# Patient Record
Sex: MALE | Race: WHITE | NOT HISPANIC OR LATINO | URBAN - METROPOLITAN AREA
[De-identification: names, ages, dates, MRNs, and addresses within clinical notes are randomized per-mention and may not be internally consistent; named-entity substitution may affect disease eponyms.]

---

## 2021-11-01 ENCOUNTER — INPATIENT (INPATIENT)
Facility: HOSPITAL | Age: 41
LOS: 4 days | Discharge: LEFT BEFORE DISCHARGE | End: 2021-11-06
Attending: STUDENT IN AN ORGANIZED HEALTH CARE EDUCATION/TRAINING PROGRAM | Admitting: STUDENT IN AN ORGANIZED HEALTH CARE EDUCATION/TRAINING PROGRAM
Payer: COMMERCIAL

## 2021-11-01 VITALS
HEIGHT: 74 IN | OXYGEN SATURATION: 100 % | WEIGHT: 212.97 LBS | HEART RATE: 130 BPM | SYSTOLIC BLOOD PRESSURE: 157 MMHG | DIASTOLIC BLOOD PRESSURE: 89 MMHG | RESPIRATION RATE: 18 BRPM

## 2021-11-01 DIAGNOSIS — Z98.890 OTHER SPECIFIED POSTPROCEDURAL STATES: Chronic | ICD-10-CM

## 2021-11-01 DIAGNOSIS — K35.80 UNSPECIFIED ACUTE APPENDICITIS: ICD-10-CM

## 2021-11-01 LAB
ALBUMIN SERPL ELPH-MCNC: 4.7 G/DL — SIGNIFICANT CHANGE UP (ref 3.5–5.2)
ALP SERPL-CCNC: 77 U/L — SIGNIFICANT CHANGE UP (ref 30–115)
ALT FLD-CCNC: 25 U/L — SIGNIFICANT CHANGE UP (ref 0–41)
ANION GAP SERPL CALC-SCNC: 20 MMOL/L — HIGH (ref 7–14)
AST SERPL-CCNC: 19 U/L — SIGNIFICANT CHANGE UP (ref 0–41)
BASOPHILS # BLD AUTO: 0.05 K/UL — SIGNIFICANT CHANGE UP (ref 0–0.2)
BASOPHILS NFR BLD AUTO: 0.9 % — SIGNIFICANT CHANGE UP (ref 0–1)
BILIRUB DIRECT SERPL-MCNC: 0.2 MG/DL — SIGNIFICANT CHANGE UP (ref 0–0.2)
BILIRUB INDIRECT FLD-MCNC: 0.4 MG/DL — SIGNIFICANT CHANGE UP (ref 0.2–1.2)
BILIRUB SERPL-MCNC: 0.6 MG/DL — SIGNIFICANT CHANGE UP (ref 0.2–1.2)
BLD GP AB SCN SERPL QL: SIGNIFICANT CHANGE UP
BUN SERPL-MCNC: 43 MG/DL — HIGH (ref 10–20)
CALCIUM SERPL-MCNC: 10.1 MG/DL — SIGNIFICANT CHANGE UP (ref 8.5–10.1)
CHLORIDE SERPL-SCNC: 90 MMOL/L — LOW (ref 98–110)
CO2 SERPL-SCNC: 26 MMOL/L — SIGNIFICANT CHANGE UP (ref 17–32)
CREAT SERPL-MCNC: 2.2 MG/DL — HIGH (ref 0.7–1.5)
EOSINOPHIL # BLD AUTO: 0 K/UL — SIGNIFICANT CHANGE UP (ref 0–0.7)
EOSINOPHIL NFR BLD AUTO: 0 % — SIGNIFICANT CHANGE UP (ref 0–8)
GLUCOSE SERPL-MCNC: 173 MG/DL — HIGH (ref 70–99)
HCT VFR BLD CALC: 50.7 % — SIGNIFICANT CHANGE UP (ref 42–52)
HGB BLD-MCNC: 17.4 G/DL — SIGNIFICANT CHANGE UP (ref 14–18)
IMM GRANULOCYTES NFR BLD AUTO: 0.5 % — HIGH (ref 0.1–0.3)
LACTATE SERPL-SCNC: 1.1 MMOL/L — SIGNIFICANT CHANGE UP (ref 0.7–2)
LACTATE SERPL-SCNC: 4.6 MMOL/L — CRITICAL HIGH (ref 0.7–2)
LIDOCAIN IGE QN: 19 U/L — SIGNIFICANT CHANGE UP (ref 7–60)
LYMPHOCYTES # BLD AUTO: 0.57 K/UL — LOW (ref 1.2–3.4)
LYMPHOCYTES # BLD AUTO: 9.8 % — LOW (ref 20.5–51.1)
MAGNESIUM SERPL-MCNC: 2.4 MG/DL — SIGNIFICANT CHANGE UP (ref 1.8–2.4)
MANUAL SMEAR VERIFICATION: SIGNIFICANT CHANGE UP
MCHC RBC-ENTMCNC: 31.2 PG — HIGH (ref 27–31)
MCHC RBC-ENTMCNC: 34.3 G/DL — SIGNIFICANT CHANGE UP (ref 32–37)
MCV RBC AUTO: 91 FL — SIGNIFICANT CHANGE UP (ref 80–94)
MONOCYTES # BLD AUTO: 0.69 K/UL — HIGH (ref 0.1–0.6)
MONOCYTES NFR BLD AUTO: 11.9 % — HIGH (ref 1.7–9.3)
NEUTROPHILS # BLD AUTO: 4.45 K/UL — SIGNIFICANT CHANGE UP (ref 1.4–6.5)
NEUTROPHILS NFR BLD AUTO: 76.9 % — HIGH (ref 42.2–75.2)
NEUTS BAND # BLD: 14 % — HIGH (ref 0–6)
NRBC # BLD: 0 /100 WBCS — SIGNIFICANT CHANGE UP (ref 0–0)
NRBC # BLD: 0 /100 — SIGNIFICANT CHANGE UP (ref 0–0)
PLAT MORPH BLD: NORMAL — SIGNIFICANT CHANGE UP
PLATELET # BLD AUTO: 255 K/UL — SIGNIFICANT CHANGE UP (ref 130–400)
POTASSIUM SERPL-MCNC: 3.5 MMOL/L — SIGNIFICANT CHANGE UP (ref 3.5–5)
POTASSIUM SERPL-SCNC: 3.5 MMOL/L — SIGNIFICANT CHANGE UP (ref 3.5–5)
PROT SERPL-MCNC: 8.6 G/DL — HIGH (ref 6–8)
RBC # BLD: 5.57 M/UL — SIGNIFICANT CHANGE UP (ref 4.7–6.1)
RBC # FLD: 12.4 % — SIGNIFICANT CHANGE UP (ref 11.5–14.5)
RBC BLD AUTO: NORMAL — SIGNIFICANT CHANGE UP
SARS-COV-2 RNA SPEC QL NAA+PROBE: SIGNIFICANT CHANGE UP
SODIUM SERPL-SCNC: 136 MMOL/L — SIGNIFICANT CHANGE UP (ref 135–146)
TROPONIN T SERPL-MCNC: <0.01 NG/ML — SIGNIFICANT CHANGE UP
WBC # BLD: 5.79 K/UL — SIGNIFICANT CHANGE UP (ref 4.8–10.8)
WBC # FLD AUTO: 5.79 K/UL — SIGNIFICANT CHANGE UP (ref 4.8–10.8)

## 2021-11-01 PROCEDURE — 93010 ELECTROCARDIOGRAM REPORT: CPT

## 2021-11-01 PROCEDURE — 99285 EMERGENCY DEPT VISIT HI MDM: CPT

## 2021-11-01 PROCEDURE — 99223 1ST HOSP IP/OBS HIGH 75: CPT

## 2021-11-01 PROCEDURE — 74177 CT ABD & PELVIS W/CONTRAST: CPT | Mod: 26,MA

## 2021-11-01 RX ORDER — CHLORHEXIDINE GLUCONATE 213 G/1000ML
1 SOLUTION TOPICAL
Refills: 0 | Status: DISCONTINUED | OUTPATIENT
Start: 2021-11-01 | End: 2021-11-06

## 2021-11-01 RX ORDER — FOLIC ACID 0.8 MG
1 TABLET ORAL ONCE
Refills: 0 | Status: COMPLETED | OUTPATIENT
Start: 2021-11-01 | End: 2021-11-01

## 2021-11-01 RX ORDER — FOLIC ACID 0.8 MG
1 TABLET ORAL DAILY
Refills: 0 | Status: DISCONTINUED | OUTPATIENT
Start: 2021-11-01 | End: 2021-11-03

## 2021-11-01 RX ORDER — SODIUM CHLORIDE 9 MG/ML
1000 INJECTION INTRAMUSCULAR; INTRAVENOUS; SUBCUTANEOUS ONCE
Refills: 0 | Status: COMPLETED | OUTPATIENT
Start: 2021-11-01 | End: 2021-11-01

## 2021-11-01 RX ORDER — MORPHINE SULFATE 50 MG/1
4 CAPSULE, EXTENDED RELEASE ORAL ONCE
Refills: 0 | Status: DISCONTINUED | OUTPATIENT
Start: 2021-11-01 | End: 2021-11-01

## 2021-11-01 RX ORDER — ONDANSETRON 8 MG/1
4 TABLET, FILM COATED ORAL EVERY 8 HOURS
Refills: 0 | Status: DISCONTINUED | OUTPATIENT
Start: 2021-11-01 | End: 2021-11-06

## 2021-11-01 RX ORDER — ONDANSETRON 8 MG/1
4 TABLET, FILM COATED ORAL ONCE
Refills: 0 | Status: COMPLETED | OUTPATIENT
Start: 2021-11-01 | End: 2021-11-01

## 2021-11-01 RX ORDER — ACETAMINOPHEN 500 MG
650 TABLET ORAL EVERY 6 HOURS
Refills: 0 | Status: DISCONTINUED | OUTPATIENT
Start: 2021-11-01 | End: 2021-11-03

## 2021-11-01 RX ORDER — SODIUM CHLORIDE 9 MG/ML
2000 INJECTION, SOLUTION INTRAVENOUS ONCE
Refills: 0 | Status: COMPLETED | OUTPATIENT
Start: 2021-11-01 | End: 2021-11-01

## 2021-11-01 RX ORDER — THIAMINE MONONITRATE (VIT B1) 100 MG
100 TABLET ORAL ONCE
Refills: 0 | Status: COMPLETED | OUTPATIENT
Start: 2021-11-01 | End: 2021-11-01

## 2021-11-01 RX ORDER — SODIUM CHLORIDE 9 MG/ML
1000 INJECTION, SOLUTION INTRAVENOUS
Refills: 0 | Status: DISCONTINUED | OUTPATIENT
Start: 2021-11-01 | End: 2021-11-06

## 2021-11-01 RX ORDER — LISINOPRIL 2.5 MG/1
40 TABLET ORAL DAILY
Refills: 0 | Status: DISCONTINUED | OUTPATIENT
Start: 2021-11-01 | End: 2021-11-03

## 2021-11-01 RX ORDER — PANTOPRAZOLE SODIUM 20 MG/1
40 TABLET, DELAYED RELEASE ORAL
Refills: 0 | Status: DISCONTINUED | OUTPATIENT
Start: 2021-11-01 | End: 2021-11-03

## 2021-11-01 RX ORDER — AMLODIPINE BESYLATE 2.5 MG/1
10 TABLET ORAL DAILY
Refills: 0 | Status: DISCONTINUED | OUTPATIENT
Start: 2021-11-01 | End: 2021-11-03

## 2021-11-01 RX ORDER — THIAMINE MONONITRATE (VIT B1) 100 MG
100 TABLET ORAL DAILY
Refills: 0 | Status: DISCONTINUED | OUTPATIENT
Start: 2021-11-01 | End: 2021-11-03

## 2021-11-01 RX ORDER — MORPHINE SULFATE 50 MG/1
2 CAPSULE, EXTENDED RELEASE ORAL EVERY 6 HOURS
Refills: 0 | Status: DISCONTINUED | OUTPATIENT
Start: 2021-11-01 | End: 2021-11-03

## 2021-11-01 RX ORDER — HEPARIN SODIUM 5000 [USP'U]/ML
5000 INJECTION INTRAVENOUS; SUBCUTANEOUS EVERY 8 HOURS
Refills: 0 | Status: DISCONTINUED | OUTPATIENT
Start: 2021-11-01 | End: 2021-11-06

## 2021-11-01 RX ORDER — PIPERACILLIN AND TAZOBACTAM 4; .5 G/20ML; G/20ML
3.38 INJECTION, POWDER, LYOPHILIZED, FOR SOLUTION INTRAVENOUS ONCE
Refills: 0 | Status: COMPLETED | OUTPATIENT
Start: 2021-11-01 | End: 2021-11-01

## 2021-11-01 RX ORDER — TRAZODONE HCL 50 MG
50 TABLET ORAL AT BEDTIME
Refills: 0 | Status: DISCONTINUED | OUTPATIENT
Start: 2021-11-01 | End: 2021-11-03

## 2021-11-01 RX ORDER — HYDROXYZINE HCL 10 MG
50 TABLET ORAL EVERY 6 HOURS
Refills: 0 | Status: DISCONTINUED | OUTPATIENT
Start: 2021-11-01 | End: 2021-11-03

## 2021-11-01 RX ORDER — PIPERACILLIN AND TAZOBACTAM 4; .5 G/20ML; G/20ML
3.38 INJECTION, POWDER, LYOPHILIZED, FOR SOLUTION INTRAVENOUS EVERY 8 HOURS
Refills: 0 | Status: DISCONTINUED | OUTPATIENT
Start: 2021-11-01 | End: 2021-11-06

## 2021-11-01 RX ADMIN — MORPHINE SULFATE 4 MILLIGRAM(S): 50 CAPSULE, EXTENDED RELEASE ORAL at 11:57

## 2021-11-01 RX ADMIN — Medication 100 MILLIGRAM(S): at 08:58

## 2021-11-01 RX ADMIN — ONDANSETRON 4 MILLIGRAM(S): 8 TABLET, FILM COATED ORAL at 08:58

## 2021-11-01 RX ADMIN — MORPHINE SULFATE 4 MILLIGRAM(S): 50 CAPSULE, EXTENDED RELEASE ORAL at 13:12

## 2021-11-01 RX ADMIN — LISINOPRIL 40 MILLIGRAM(S): 2.5 TABLET ORAL at 16:34

## 2021-11-01 RX ADMIN — Medication 50 MILLIGRAM(S): at 22:26

## 2021-11-01 RX ADMIN — PIPERACILLIN AND TAZOBACTAM 25 GRAM(S): 4; .5 INJECTION, POWDER, LYOPHILIZED, FOR SOLUTION INTRAVENOUS at 22:19

## 2021-11-01 RX ADMIN — SODIUM CHLORIDE 2000 MILLILITER(S): 9 INJECTION, SOLUTION INTRAVENOUS at 11:57

## 2021-11-01 RX ADMIN — MORPHINE SULFATE 4 MILLIGRAM(S): 50 CAPSULE, EXTENDED RELEASE ORAL at 22:33

## 2021-11-01 RX ADMIN — MORPHINE SULFATE 4 MILLIGRAM(S): 50 CAPSULE, EXTENDED RELEASE ORAL at 08:58

## 2021-11-01 RX ADMIN — SODIUM CHLORIDE 150 MILLILITER(S): 9 INJECTION, SOLUTION INTRAVENOUS at 22:32

## 2021-11-01 RX ADMIN — Medication 1 MILLIGRAM(S): at 09:09

## 2021-11-01 RX ADMIN — PIPERACILLIN AND TAZOBACTAM 200 GRAM(S): 4; .5 INJECTION, POWDER, LYOPHILIZED, FOR SOLUTION INTRAVENOUS at 11:53

## 2021-11-01 RX ADMIN — HEPARIN SODIUM 5000 UNIT(S): 5000 INJECTION INTRAVENOUS; SUBCUTANEOUS at 22:26

## 2021-11-01 RX ADMIN — SODIUM CHLORIDE 150 MILLILITER(S): 9 INJECTION, SOLUTION INTRAVENOUS at 16:42

## 2021-11-01 RX ADMIN — Medication 25 MILLIGRAM(S): at 17:54

## 2021-11-01 RX ADMIN — PIPERACILLIN AND TAZOBACTAM 3.38 GRAM(S): 4; .5 INJECTION, POWDER, LYOPHILIZED, FOR SOLUTION INTRAVENOUS at 13:52

## 2021-11-01 RX ADMIN — HEPARIN SODIUM 5000 UNIT(S): 5000 INJECTION INTRAVENOUS; SUBCUTANEOUS at 18:27

## 2021-11-01 RX ADMIN — AMLODIPINE BESYLATE 10 MILLIGRAM(S): 2.5 TABLET ORAL at 16:33

## 2021-11-01 RX ADMIN — SODIUM CHLORIDE 1000 MILLILITER(S): 9 INJECTION INTRAMUSCULAR; INTRAVENOUS; SUBCUTANEOUS at 16:41

## 2021-11-01 RX ADMIN — MORPHINE SULFATE 4 MILLIGRAM(S): 50 CAPSULE, EXTENDED RELEASE ORAL at 22:26

## 2021-11-01 RX ADMIN — MORPHINE SULFATE 2 MILLIGRAM(S): 50 CAPSULE, EXTENDED RELEASE ORAL at 17:45

## 2021-11-01 RX ADMIN — SODIUM CHLORIDE 1000 MILLILITER(S): 9 INJECTION INTRAMUSCULAR; INTRAVENOUS; SUBCUTANEOUS at 13:02

## 2021-11-01 RX ADMIN — MORPHINE SULFATE 4 MILLIGRAM(S): 50 CAPSULE, EXTENDED RELEASE ORAL at 13:52

## 2021-11-01 RX ADMIN — PANTOPRAZOLE SODIUM 40 MILLIGRAM(S): 20 TABLET, DELAYED RELEASE ORAL at 16:34

## 2021-11-01 RX ADMIN — SODIUM CHLORIDE 2000 MILLILITER(S): 9 INJECTION, SOLUTION INTRAVENOUS at 08:58

## 2021-11-01 RX ADMIN — ONDANSETRON 4 MILLIGRAM(S): 8 TABLET, FILM COATED ORAL at 22:26

## 2021-11-01 NOTE — ED PROVIDER NOTE - CARE PLAN
1 Principal Discharge DX:	Acute appendicitis  Secondary Diagnosis:	SBO (small bowel obstruction)  Secondary Diagnosis:	LEANN (acute kidney injury)  Secondary Diagnosis:	Alcohol dependence

## 2021-11-01 NOTE — H&P ADULT - ASSESSMENT
41yo man with a history of HTN now w/ n/v for 3d, found to have acute perforated appendicits on CT. HD stable.

## 2021-11-01 NOTE — H&P ADULT - HISTORY OF PRESENT ILLNESS
40 yr old male with PMH of HTN presents with abdominal pain, nausea and vomiting for last 3 days. Pt states this started 3 days ago. He was in so much that he could not keep any food or water down. He thought it could be food poisoning and tried to ride it out. He states it got slightly better but still in pain. He states he has been having bowels and passing gas without any issue. He admits to drinking daily or every other day but denies any other illicit drugs. His last drink was 3 days ago. He denies getting a colonoscopy. He admits to diarrhea, chills night sweats but denies fever.  40 yr old male with PMH of HTN presents with abdominal pain, nausea and vomiting for last 3 days. Pt states this started 3 days ago. He was in so much that he could not keep any food or water down. He thought it could be food poisoning and thought his symptoms would resolve.  He states it got slightly better but still in pain. He states he has been having bowels and passing gas without any issue. He admits to drinking daily or every other day but denies any other illicit drugs. His last drink was 3 days ago. He denies getting a colonoscopy. He admits to diarrhea, chills night sweats but denies fever.

## 2021-11-01 NOTE — H&P ADULT - ATTENDING COMMENTS
This is a 41yo man with a history of HTN and alcohol dependence, who presents with nausea and NBNB emesis for 3 days. Pt describes having eaten beef tacos on Friday evening, and the following morning awoke with nausea. Nausea is described as persistent and led to several episodes of vomiting throughout the day on Saturday. Pt describes relief with emesis, but developed muscular soreness and epigastric pain secondary to repeated episodes. The pain remained in this location and did not migrate at any point; pt describes it as feeling similar to food poisoning he has experienced in the past. There is no radiation or alleviating/exacerbating factors. Pt denies fevers, chills. He endorses diarrhea without blood being present. He has cont'd ot pass stool and flatus til the present. He denies recent wt loss, sick contacts, travel. Denies recent abx use. He has never undergone upper or lower endoscopy, and denies a known personal or family history of IBD. He felt very dehydrated but 'could not keep anything down' so came to the ED for evaluation.    In ER, pt found to be afebrile. The abdomen is distended but normal in size per his baseline per pt and girlfriend at bedside. There is moderate tenderness in the epigastrium without rebound. There is no RLQ tenderness. There is a reducible umbilical hernia without overlying skin change. He is non-tremulous, and the skin is not jaundiced.    Labs show a normal WBC ct but with band shift; LEANN with Cr 2.2. Lactate on admission elevated 4.6; down to 1.1 with fluid - appears to be hemoconcentrated. CT was done and showed acute perforated appendicitis with secondary ileus vs SBO. I have spoken with two separate radiologists who agree, that despite the patient's atypical presentation, this denotes a primary appendiceal with secondary ileus picture, rather than a small bowel pathologic process with secondary appendiceal involvement. There is no e/o bowel ischemia or abscess at the moment.    I advised the pt we will admit him; keep NPO. if nausea or emesis recur, will place NG tube. CIWA protocol. Broad spec abx. Trend labs. Hydrate with crystalloid. Serial abdominal exams. I let the pt know 50% of patients will develop intraabdominal abscess in this setting, necessitating perc drainage at the time of index hospitalization; a much smaller minority will not respond to nonoperative management and will require urgent operative intervention. Should neither of these apply to the pt, and he recovers uneventfully during this hospitalization, will likely plan for outpatient lower endoscopy given unusual presentation followed by elective interval appendectomy in 4-6 weeks.

## 2021-11-01 NOTE — H&P ADULT - NSHPSOCIALHISTORY_GEN_ALL_CORE
Patient is a current smoker; 5 ciagrettes per day  No illicit drug use  3 beers daily Patient is a current smoker; 5 cigarettes per day  No illicit drug use  3 beers daily

## 2021-11-01 NOTE — H&P ADULT - NSHPPHYSICALEXAM_GEN_ALL_CORE
Gen: Well-appearing man, in NAD supine  HEENT: Sclerae anicteric; trachea midline. NCAT.  CV: rrr; no m/g/r  Lungs: CTAB; no cyanosis and no accessory muscle use  Abd: Firm; mildly distended. Mild tenderness in epigastrium without rebound. +reducible umbilical hernia.  Ext: Warm and NV intact with intact pedal pulses  Neuro: Strength 5/5 and equivalent in all extremities; DTRs intact.  Skin: Warm; dry  Psych: Normal mood and affect Vital Signs Last 24 Hrs  T(C): 36.3 (01 Nov 2021 15:46), Max: 37.1 (01 Nov 2021 13:47)  T(F): 97.3 (01 Nov 2021 15:46), Max: 98.8 (01 Nov 2021 13:47)  HR: 110 (01 Nov 2021 15:46) (102 - 130)  BP: 123/84 (01 Nov 2021 15:46) (123/84 - 157/89)  RR: 18 (01 Nov 2021 15:46) (16 - 18)  SpO2: 98% (01 Nov 2021 15:46) (97% - 100%)    Gen: Well-appearing man, in NAD supine  HEENT: Sclerae anicteric; trachea midline. NCAT.  CV: rrr; no m/g/r  Lungs: CTAB; no cyanosis and no accessory muscle use  Abd: Firm; mildly distended. Mild tenderness in epigastrium without rebound. +reducible umbilical hernia.  Ext: Warm and NV intact with intact pedal pulses  Neuro: Strength 5/5 and equivalent in all extremities; DTRs intact.  Skin: Warm; dry  Psych: Normal mood and affect

## 2021-11-01 NOTE — ED PROVIDER NOTE - OBJECTIVE STATEMENT
41 yo male, pmh of htn, daily etoh use ( 6 beers per day), presents to ed for epigastric pain, x 2 days, mild, aching, no radiation, a/w nv. Denies fever, chills, cp, sob, diarrhea, dysuria, tremors.

## 2021-11-01 NOTE — ED PROVIDER NOTE - PHYSICAL EXAMINATION
Physical Exam    Vital Signs: I have reviewed the initial vital signs.  Constitutional: well-nourished, appears stated age, no acute distress  Eyes: Conjunctiva pink, Sclera clear  Cardiovascular: S1 and S2, tachycardia, regular rhythm, well-perfused extremities, radial pulses equal and 2+, pedal pulses 2+ and equal   Respiratory: unlabored respiratory effort, clear to auscultation bilaterally no wheezing, rales and rhonchi  Gastrointestinal: soft, epigastric ttp, no guarding or rebound, no cvattp, no pulsatile mass, normal bowl sounds  Musculoskeletal: supple neck, no lower extremity edema, no midline tenderness  Integumentary: warm, dry, no rash  Neurologic: awake, alert, nvi, no tremors.

## 2021-11-01 NOTE — ED PROVIDER NOTE - ATTENDING CONTRIBUTION TO CARE
40 yr old male  eval for eval of n/v with epigastric, periumbiluical pain. No fever, chills. pt having bowel mvts on exam pt no distress mild tachy, epigastric ttp w/o r/g. labs showing  nohelia, normal wbc, lipase. ct showing Findings compatible with acute appendicitis. Distal small bowel bowel obstruction is also present. Ill-defined central mesenteric locule of fluid and gas measuring 3.2 x 2.9 cm, compatible with perforation, presumably related to the appendicitis but given small bowel obstruction also present, cannot exclude this being from bowel perforation. pt given abx, seen by surgery admitted for further eval.

## 2021-11-01 NOTE — H&P ADULT - NSHPLABSRESULTS_GEN_ALL_CORE
WBC ct 5.79  +bandemia    Lact 4.6 -> 1.1 with 2L crystalloid  Cr 2.2  LFTs normal    CT with perforated appendicitis and secondary SBO vs ileus WBC ct 5.79  +bandemia    Lact 4.6 -> 1.1 with 2L crystalloid  Cr 2.2  LFTs normal    CT with perforated appendicitis and secondary SBO vs ileus      11-01    136  |  90<L>  |  43<H>  ----------------------------<  173<H>  3.5   |  26  |  2.2<H>    Ca    10.1      01 Nov 2021 09:00  Mg     2.4     11-01    TPro  8.6<H>  /  Alb  4.7  /  TBili  0.6  /  DBili  0.2  /  AST  19  /  ALT  25  /  AlkPhos  77  11-01                            17.4   5.79  )-----------( 255      ( 01 Nov 2021 09:00 )             50.7     CAPILLARY BLOOD GLUCOSE        CARDIAC MARKERS ( 01 Nov 2021 09:00 )  x     / <0.01 ng/mL / x     / x     / x          < from: CT Abdomen and Pelvis w/ IV Cont (11.01.21 @ 10:39) >    IMPRESSION:    Findings compatible with acute appendicitis. Distal small bowel bowel obstruction is also present. Ill-defined central mesenteric locule of fluid and gas measuring 3.2 x 2.9 cm, compatible with perforation, presumably related to the appendicitis but given small bowel obstruction also present, cannot exclude this being from bowel perforation.    Findings discussed with Dr. Robb on 11/1/2021 at 11:14 AM.      < end of copied text >

## 2021-11-01 NOTE — H&P ADULT - NSICDXFAMILYHX_GEN_ALL_CORE_FT
FAMILY HISTORY:  Father  Still living? Unknown  Family history of hyperlipidemia, Age at diagnosis: Age Unknown    Mother  Still living? Unknown  Maternal family history of hypertension, Age at diagnosis: Age Unknown

## 2021-11-01 NOTE — ED PROVIDER NOTE - CLINICAL SUMMARY MEDICAL DECISION MAKING FREE TEXT BOX
abdominal pain, with n/v. CT showing acute appendicitis. Distal small bowel bowel obstruction is also present. Ill-defined central mesenteric locule of fluid and gas measuring 3.2 x 2.9 cm, compatible with perforation, presumably related to the appendicitis but given small bowel obstruction also present, cannot exclude this being from bowel perforation. pt given abx, seen by surgery admitted for further eval.

## 2021-11-01 NOTE — SBIRT NOTE ADULT - NSSBIRTBRIEFINTDET_GEN_A_CORE
Screening results were reviewed with the patient and patient was provided information about healthy guidelines and potential negative consequences associated with level of risk. Motivation and readiness to reduce or stop use was discussed and goals and activities to make changes were suggested/offered.

## 2021-11-01 NOTE — ED ADULT NURSE REASSESSMENT NOTE - NS ED NURSE REASSESS COMMENT FT1
Pt's discharge heart rate found to be 120. RAYMOND Gifford advised. Pt given Morphine 2 mg IV (PRN) as well as Librium 25 mg PO. 4th floor receiving RN Luda advised.

## 2021-11-02 LAB
ANION GAP SERPL CALC-SCNC: 11 MMOL/L — SIGNIFICANT CHANGE UP (ref 7–14)
BASOPHILS # BLD AUTO: 0.01 K/UL — SIGNIFICANT CHANGE UP (ref 0–0.2)
BASOPHILS NFR BLD AUTO: 0.2 % — SIGNIFICANT CHANGE UP (ref 0–1)
BUN SERPL-MCNC: 28 MG/DL — HIGH (ref 10–20)
CALCIUM SERPL-MCNC: 8.7 MG/DL — SIGNIFICANT CHANGE UP (ref 8.5–10.1)
CHLORIDE SERPL-SCNC: 97 MMOL/L — LOW (ref 98–110)
CO2 SERPL-SCNC: 29 MMOL/L — SIGNIFICANT CHANGE UP (ref 17–32)
CREAT SERPL-MCNC: 0.9 MG/DL — SIGNIFICANT CHANGE UP (ref 0.7–1.5)
EOSINOPHIL # BLD AUTO: 0.02 K/UL — SIGNIFICANT CHANGE UP (ref 0–0.7)
EOSINOPHIL NFR BLD AUTO: 0.4 % — SIGNIFICANT CHANGE UP (ref 0–8)
GLUCOSE SERPL-MCNC: 108 MG/DL — HIGH (ref 70–99)
HCT VFR BLD CALC: 39.9 % — LOW (ref 42–52)
HGB BLD-MCNC: 13.9 G/DL — LOW (ref 14–18)
IMM GRANULOCYTES NFR BLD AUTO: 0.2 % — SIGNIFICANT CHANGE UP (ref 0.1–0.3)
LYMPHOCYTES # BLD AUTO: 0.77 K/UL — LOW (ref 1.2–3.4)
LYMPHOCYTES # BLD AUTO: 14.9 % — LOW (ref 20.5–51.1)
MAGNESIUM SERPL-MCNC: 2 MG/DL — SIGNIFICANT CHANGE UP (ref 1.8–2.4)
MCHC RBC-ENTMCNC: 31.4 PG — HIGH (ref 27–31)
MCHC RBC-ENTMCNC: 34.8 G/DL — SIGNIFICANT CHANGE UP (ref 32–37)
MCV RBC AUTO: 90.3 FL — SIGNIFICANT CHANGE UP (ref 80–94)
MONOCYTES # BLD AUTO: 0.82 K/UL — HIGH (ref 0.1–0.6)
MONOCYTES NFR BLD AUTO: 15.9 % — HIGH (ref 1.7–9.3)
NEUTROPHILS # BLD AUTO: 3.54 K/UL — SIGNIFICANT CHANGE UP (ref 1.4–6.5)
NEUTROPHILS NFR BLD AUTO: 68.4 % — SIGNIFICANT CHANGE UP (ref 42.2–75.2)
NRBC # BLD: 0 /100 WBCS — SIGNIFICANT CHANGE UP (ref 0–0)
PLATELET # BLD AUTO: 211 K/UL — SIGNIFICANT CHANGE UP (ref 130–400)
POTASSIUM SERPL-MCNC: 3.2 MMOL/L — LOW (ref 3.5–5)
POTASSIUM SERPL-SCNC: 3.2 MMOL/L — LOW (ref 3.5–5)
RBC # BLD: 4.42 M/UL — LOW (ref 4.7–6.1)
RBC # FLD: 12.5 % — SIGNIFICANT CHANGE UP (ref 11.5–14.5)
SODIUM SERPL-SCNC: 137 MMOL/L — SIGNIFICANT CHANGE UP (ref 135–146)
WBC # BLD: 5.17 K/UL — SIGNIFICANT CHANGE UP (ref 4.8–10.8)
WBC # FLD AUTO: 5.17 K/UL — SIGNIFICANT CHANGE UP (ref 4.8–10.8)

## 2021-11-02 PROCEDURE — 99232 SBSQ HOSP IP/OBS MODERATE 35: CPT

## 2021-11-02 RX ORDER — MORPHINE SULFATE 50 MG/1
2 CAPSULE, EXTENDED RELEASE ORAL ONCE
Refills: 0 | Status: DISCONTINUED | OUTPATIENT
Start: 2021-11-02 | End: 2021-11-02

## 2021-11-02 RX ORDER — POTASSIUM CHLORIDE 20 MEQ
20 PACKET (EA) ORAL
Refills: 0 | Status: COMPLETED | OUTPATIENT
Start: 2021-11-02 | End: 2021-11-02

## 2021-11-02 RX ADMIN — HEPARIN SODIUM 5000 UNIT(S): 5000 INJECTION INTRAVENOUS; SUBCUTANEOUS at 05:34

## 2021-11-02 RX ADMIN — MORPHINE SULFATE 2 MILLIGRAM(S): 50 CAPSULE, EXTENDED RELEASE ORAL at 14:16

## 2021-11-02 RX ADMIN — MORPHINE SULFATE 2 MILLIGRAM(S): 50 CAPSULE, EXTENDED RELEASE ORAL at 05:37

## 2021-11-02 RX ADMIN — Medication 50 MILLIEQUIVALENT(S): at 16:31

## 2021-11-02 RX ADMIN — Medication 1 MILLIGRAM(S): at 12:12

## 2021-11-02 RX ADMIN — Medication 50 MILLIGRAM(S): at 21:04

## 2021-11-02 RX ADMIN — Medication 50 MILLIEQUIVALENT(S): at 14:00

## 2021-11-02 RX ADMIN — PIPERACILLIN AND TAZOBACTAM 25 GRAM(S): 4; .5 INJECTION, POWDER, LYOPHILIZED, FOR SOLUTION INTRAVENOUS at 14:01

## 2021-11-02 RX ADMIN — MORPHINE SULFATE 2 MILLIGRAM(S): 50 CAPSULE, EXTENDED RELEASE ORAL at 10:30

## 2021-11-02 RX ADMIN — AMLODIPINE BESYLATE 10 MILLIGRAM(S): 2.5 TABLET ORAL at 05:33

## 2021-11-02 RX ADMIN — SODIUM CHLORIDE 150 MILLILITER(S): 9 INJECTION, SOLUTION INTRAVENOUS at 14:00

## 2021-11-02 RX ADMIN — MORPHINE SULFATE 2 MILLIGRAM(S): 50 CAPSULE, EXTENDED RELEASE ORAL at 21:03

## 2021-11-02 RX ADMIN — HEPARIN SODIUM 5000 UNIT(S): 5000 INJECTION INTRAVENOUS; SUBCUTANEOUS at 21:05

## 2021-11-02 RX ADMIN — PIPERACILLIN AND TAZOBACTAM 25 GRAM(S): 4; .5 INJECTION, POWDER, LYOPHILIZED, FOR SOLUTION INTRAVENOUS at 05:35

## 2021-11-02 RX ADMIN — CHLORHEXIDINE GLUCONATE 1 APPLICATION(S): 213 SOLUTION TOPICAL at 05:35

## 2021-11-02 RX ADMIN — PANTOPRAZOLE SODIUM 40 MILLIGRAM(S): 20 TABLET, DELAYED RELEASE ORAL at 05:34

## 2021-11-02 RX ADMIN — PIPERACILLIN AND TAZOBACTAM 25 GRAM(S): 4; .5 INJECTION, POWDER, LYOPHILIZED, FOR SOLUTION INTRAVENOUS at 21:03

## 2021-11-02 RX ADMIN — HEPARIN SODIUM 5000 UNIT(S): 5000 INJECTION INTRAVENOUS; SUBCUTANEOUS at 14:03

## 2021-11-02 RX ADMIN — SODIUM CHLORIDE 150 MILLILITER(S): 9 INJECTION, SOLUTION INTRAVENOUS at 23:34

## 2021-11-02 RX ADMIN — Medication 100 MILLIGRAM(S): at 12:12

## 2021-11-02 RX ADMIN — LISINOPRIL 40 MILLIGRAM(S): 2.5 TABLET ORAL at 05:34

## 2021-11-02 RX ADMIN — MORPHINE SULFATE 2 MILLIGRAM(S): 50 CAPSULE, EXTENDED RELEASE ORAL at 10:29

## 2021-11-02 RX ADMIN — MORPHINE SULFATE 2 MILLIGRAM(S): 50 CAPSULE, EXTENDED RELEASE ORAL at 05:46

## 2021-11-02 NOTE — PROGRESS NOTE ADULT - SUBJECTIVE AND OBJECTIVE BOX
S; Pt reports mild improvement in abdominal pain/distention today - having flatus and watery BM this AM. Still with concentrated urine but voiding without difficulty. +nausea but no vomiting.  Tolerating chips & sips.  Denies shakes/tremors. Afebrile overnight  O; Vital Signs Last 24 Hrs  T(C): 36.9 (02 Nov 2021 04:43), Max: 37.1 (01 Nov 2021 13:47)  T(F): 98.4 (02 Nov 2021 04:43), Max: 98.8 (01 Nov 2021 13:47)  HR: 102 (02 Nov 2021 04:43) (102 - 120)  BP: 136/84 (02 Nov 2021 04:43) (123/84 - 148/101)  BP(mean): --  RR: 18 (02 Nov 2021 04:43) (16 - 20)  SpO2: 98% (01 Nov 2021 17:42) (97% - 98%)    MEDICATIONS  (STANDING):  amLODIPine   Tablet 10 milliGRAM(s) Oral daily  chlorhexidine 4% Liquid 1 Application(s) Topical <User Schedule>  folic acid 1 milliGRAM(s) Oral daily  heparin   Injectable 5000 Unit(s) SubCutaneous every 8 hours  lactated ringers. 1000 milliLiter(s) (150 mL/Hr) IV Continuous <Continuous>  lisinopril 40 milliGRAM(s) Oral daily  pantoprazole    Tablet 40 milliGRAM(s) Oral before breakfast  piperacillin/tazobactam IVPB.. 3.375 Gram(s) IV Intermittent every 8 hours  potassium chloride  20 mEq/100 mL IVPB 20 milliEquivalent(s) IV Intermittent every 2 hours  thiamine 100 milliGRAM(s) Oral daily    MEDICATIONS  (PRN):  acetaminophen     Tablet .. 650 milliGRAM(s) Oral every 6 hours PRN Temp greater or equal to 38C (100.4F)  chlordiazePOXIDE 25 milliGRAM(s) Oral every 4 hours PRN Alcohol Withdrawal Symptoms  hydrOXYzine hydrochloride 50 milliGRAM(s) Oral every 6 hours PRN Anxiety etoh WD  morphine  - Injectable 2 milliGRAM(s) IV Push every 6 hours PRN Severe Pain (7 - 10)  ondansetron Injectable 4 milliGRAM(s) IV Push every 8 hours PRN Nausea and/or Vomiting  traZODone 50 milliGRAM(s) Oral at bedtime PRN sleep    EXAM:  lungs: cta  cvs: s1s2, +tachy  abd: soft, distended, +LUQ/upper abdominal and RLQ tenderness, decreased BS    Labs:  CAPILLARY BLOOD GLUCOSE                              13.9   5.17  )-----------( 211      ( 02 Nov 2021 06:25 )             39.9       Auto Neutrophil %: 68.4 % (11-02-21 @ 06:25)  Auto Immature Granulocyte %: 0.2 % (11-02-21 @ 06:25)    11-02    137  |  97<L>  |  28<H>  ----------------------------<  108<H>  3.2<L>   |  29  |  0.9      Calcium, Total Serum: 8.7 mg/dL (11-02-21 @ 06:25)      LFTs:             8.6  | 0.6  | 19       ------------------[77      ( 01 Nov 2021 09:00 )  4.7  | 0.2  | 25          Lipase:19     Amylase:x         Lactate, Blood: 1.1 mmol/L (11-01-21 @ 11:13)  Lactate, Blood: 4.6 mmol/L (11-01-21 @ 09:00)      Coags:    CARDIAC MARKERS ( 01 Nov 2021 09:00 )  x     / <0.01 ng/mL / x     / x     / x                 S; Pt reports mild improvement in abdominal pain/distention today - having flatus and watery BM this AM. Still with concentrated urine but voiding without difficulty. +nausea but no vomiting.  Tolerating chips & sips.  Denies shakes/tremors. Afebrile overnight  O; Vital Signs Last 24 Hrs  T(C): 36.9 (02 Nov 2021 04:43), Max: 37.1 (01 Nov 2021 13:47)  T(F): 98.4 (02 Nov 2021 04:43), Max: 98.8 (01 Nov 2021 13:47)  HR: 102 (02 Nov 2021 04:43) (102 - 120)  BP: 136/84 (02 Nov 2021 04:43) (123/84 - 148/101)  BP(mean): --  RR: 18 (02 Nov 2021 04:43) (16 - 20)  SpO2: 98% (01 Nov 2021 17:42) (97% - 98%)    MEDICATIONS  (STANDING):  amLODIPine   Tablet 10 milliGRAM(s) Oral daily  chlorhexidine 4% Liquid 1 Application(s) Topical <User Schedule>  folic acid 1 milliGRAM(s) Oral daily  heparin   Injectable 5000 Unit(s) SubCutaneous every 8 hours  lactated ringers. 1000 milliLiter(s) (150 mL/Hr) IV Continuous <Continuous>  lisinopril 40 milliGRAM(s) Oral daily  pantoprazole    Tablet 40 milliGRAM(s) Oral before breakfast  piperacillin/tazobactam IVPB.. 3.375 Gram(s) IV Intermittent every 8 hours  potassium chloride  20 mEq/100 mL IVPB 20 milliEquivalent(s) IV Intermittent every 2 hours  thiamine 100 milliGRAM(s) Oral daily    MEDICATIONS  (PRN):  acetaminophen     Tablet .. 650 milliGRAM(s) Oral every 6 hours PRN Temp greater or equal to 38C (100.4F)  chlordiazePOXIDE 25 milliGRAM(s) Oral every 4 hours PRN Alcohol Withdrawal Symptoms  hydrOXYzine hydrochloride 50 milliGRAM(s) Oral every 6 hours PRN Anxiety etoh WD  morphine  - Injectable 2 milliGRAM(s) IV Push every 6 hours PRN Severe Pain (7 - 10)  ondansetron Injectable 4 milliGRAM(s) IV Push every 8 hours PRN Nausea and/or Vomiting  traZODone 50 milliGRAM(s) Oral at bedtime PRN sleep    EXAM:  Gen: well appearing man, supine in NAD  lungs: cta  cvs: s1s2, +tachy  abd: soft, distended, +LUQ/upper abdominal and RLQ tenderness, decreased BS  ext: warm and NV intact with intac pulses  skin: warm and dry; no jaundice  neuro: CN 2-12 intact; strength equal bilaterally  psych: normal mood and affect    Labs:  CAPILLARY BLOOD GLUCOSE                              13.9   5.17  )-----------( 211      ( 02 Nov 2021 06:25 )             39.9       Auto Neutrophil %: 68.4 % (11-02-21 @ 06:25)  Auto Immature Granulocyte %: 0.2 % (11-02-21 @ 06:25)    11-02    137  |  97<L>  |  28<H>  ----------------------------<  108<H>  3.2<L>   |  29  |  0.9      Calcium, Total Serum: 8.7 mg/dL (11-02-21 @ 06:25)      LFTs:             8.6  | 0.6  | 19       ------------------[77      ( 01 Nov 2021 09:00 )  4.7  | 0.2  | 25          Lipase:19     Amylase:x         Lactate, Blood: 1.1 mmol/L (11-01-21 @ 11:13)  Lactate, Blood: 4.6 mmol/L (11-01-21 @ 09:00)      Coags:    CARDIAC MARKERS ( 01 Nov 2021 09:00 )  x     / <0.01 ng/mL / x     / x     / x

## 2021-11-03 LAB
ANION GAP SERPL CALC-SCNC: 14 MMOL/L — SIGNIFICANT CHANGE UP (ref 7–14)
BUN SERPL-MCNC: 22 MG/DL — HIGH (ref 10–20)
CALCIUM SERPL-MCNC: 8.8 MG/DL — SIGNIFICANT CHANGE UP (ref 8.5–10.1)
CHLORIDE SERPL-SCNC: 98 MMOL/L — SIGNIFICANT CHANGE UP (ref 98–110)
CO2 SERPL-SCNC: 24 MMOL/L — SIGNIFICANT CHANGE UP (ref 17–32)
COVID-19 SPIKE DOMAIN AB INTERP: POSITIVE
COVID-19 SPIKE DOMAIN ANTIBODY RESULT: >250 U/ML — HIGH
CREAT SERPL-MCNC: 0.9 MG/DL — SIGNIFICANT CHANGE UP (ref 0.7–1.5)
GLUCOSE SERPL-MCNC: 102 MG/DL — HIGH (ref 70–99)
HCT VFR BLD CALC: 44 % — SIGNIFICANT CHANGE UP (ref 42–52)
HGB BLD-MCNC: 14.9 G/DL — SIGNIFICANT CHANGE UP (ref 14–18)
MAGNESIUM SERPL-MCNC: 1.9 MG/DL — SIGNIFICANT CHANGE UP (ref 1.8–2.4)
MCHC RBC-ENTMCNC: 31 PG — SIGNIFICANT CHANGE UP (ref 27–31)
MCHC RBC-ENTMCNC: 33.9 G/DL — SIGNIFICANT CHANGE UP (ref 32–37)
MCV RBC AUTO: 91.5 FL — SIGNIFICANT CHANGE UP (ref 80–94)
NRBC # BLD: 0 /100 WBCS — SIGNIFICANT CHANGE UP (ref 0–0)
PHOSPHATE SERPL-MCNC: 3.2 MG/DL — SIGNIFICANT CHANGE UP (ref 2.1–4.9)
PLATELET # BLD AUTO: 241 K/UL — SIGNIFICANT CHANGE UP (ref 130–400)
POTASSIUM SERPL-MCNC: 3.2 MMOL/L — LOW (ref 3.5–5)
POTASSIUM SERPL-SCNC: 3.2 MMOL/L — LOW (ref 3.5–5)
RBC # BLD: 4.81 M/UL — SIGNIFICANT CHANGE UP (ref 4.7–6.1)
RBC # FLD: 12.6 % — SIGNIFICANT CHANGE UP (ref 11.5–14.5)
SARS-COV-2 IGG+IGM SERPL QL IA: >250 U/ML — HIGH
SARS-COV-2 IGG+IGM SERPL QL IA: POSITIVE
SODIUM SERPL-SCNC: 136 MMOL/L — SIGNIFICANT CHANGE UP (ref 135–146)
WBC # BLD: 7.53 K/UL — SIGNIFICANT CHANGE UP (ref 4.8–10.8)
WBC # FLD AUTO: 7.53 K/UL — SIGNIFICANT CHANGE UP (ref 4.8–10.8)

## 2021-11-03 PROCEDURE — 71045 X-RAY EXAM CHEST 1 VIEW: CPT | Mod: 26

## 2021-11-03 PROCEDURE — 74018 RADEX ABDOMEN 1 VIEW: CPT | Mod: 26

## 2021-11-03 PROCEDURE — 99232 SBSQ HOSP IP/OBS MODERATE 35: CPT

## 2021-11-03 RX ORDER — SODIUM CHLORIDE 9 MG/ML
1000 INJECTION INTRAMUSCULAR; INTRAVENOUS; SUBCUTANEOUS ONCE
Refills: 0 | Status: COMPLETED | OUTPATIENT
Start: 2021-11-03 | End: 2021-11-03

## 2021-11-03 RX ORDER — POTASSIUM CHLORIDE 20 MEQ
20 PACKET (EA) ORAL
Refills: 0 | Status: COMPLETED | OUTPATIENT
Start: 2021-11-03 | End: 2021-11-03

## 2021-11-03 RX ORDER — MAGNESIUM SULFATE 500 MG/ML
1 VIAL (ML) INJECTION ONCE
Refills: 0 | Status: COMPLETED | OUTPATIENT
Start: 2021-11-03 | End: 2021-11-03

## 2021-11-03 RX ORDER — MORPHINE SULFATE 50 MG/1
2 CAPSULE, EXTENDED RELEASE ORAL ONCE
Refills: 0 | Status: DISCONTINUED | OUTPATIENT
Start: 2021-11-03 | End: 2021-11-03

## 2021-11-03 RX ORDER — KETOROLAC TROMETHAMINE 30 MG/ML
15 SYRINGE (ML) INJECTION EVERY 6 HOURS
Refills: 0 | Status: DISCONTINUED | OUTPATIENT
Start: 2021-11-03 | End: 2021-11-06

## 2021-11-03 RX ORDER — POTASSIUM CHLORIDE 20 MEQ
20 PACKET (EA) ORAL
Refills: 0 | Status: DISCONTINUED | OUTPATIENT
Start: 2021-11-03 | End: 2021-11-03

## 2021-11-03 RX ORDER — SIMETHICONE 80 MG/1
80 TABLET, CHEWABLE ORAL EVERY 6 HOURS
Refills: 0 | Status: DISCONTINUED | OUTPATIENT
Start: 2021-11-03 | End: 2021-11-03

## 2021-11-03 RX ORDER — BENZOCAINE 10 %
1 GEL (GRAM) MUCOUS MEMBRANE EVERY 4 HOURS
Refills: 0 | Status: DISCONTINUED | OUTPATIENT
Start: 2021-11-03 | End: 2021-11-04

## 2021-11-03 RX ORDER — PANTOPRAZOLE SODIUM 20 MG/1
40 TABLET, DELAYED RELEASE ORAL DAILY
Refills: 0 | Status: DISCONTINUED | OUTPATIENT
Start: 2021-11-03 | End: 2021-11-06

## 2021-11-03 RX ADMIN — Medication 100 MILLIGRAM(S): at 11:37

## 2021-11-03 RX ADMIN — HEPARIN SODIUM 5000 UNIT(S): 5000 INJECTION INTRAVENOUS; SUBCUTANEOUS at 13:22

## 2021-11-03 RX ADMIN — Medication 50 MILLIEQUIVALENT(S): at 14:55

## 2021-11-03 RX ADMIN — Medication 1 MILLIGRAM(S): at 11:37

## 2021-11-03 RX ADMIN — SODIUM CHLORIDE 1000 MILLILITER(S): 9 INJECTION INTRAMUSCULAR; INTRAVENOUS; SUBCUTANEOUS at 21:48

## 2021-11-03 RX ADMIN — Medication 100 GRAM(S): at 14:55

## 2021-11-03 RX ADMIN — Medication 50 MILLIEQUIVALENT(S): at 16:34

## 2021-11-03 RX ADMIN — PIPERACILLIN AND TAZOBACTAM 25 GRAM(S): 4; .5 INJECTION, POWDER, LYOPHILIZED, FOR SOLUTION INTRAVENOUS at 21:19

## 2021-11-03 RX ADMIN — PIPERACILLIN AND TAZOBACTAM 25 GRAM(S): 4; .5 INJECTION, POWDER, LYOPHILIZED, FOR SOLUTION INTRAVENOUS at 05:02

## 2021-11-03 RX ADMIN — MORPHINE SULFATE 2 MILLIGRAM(S): 50 CAPSULE, EXTENDED RELEASE ORAL at 05:30

## 2021-11-03 RX ADMIN — AMLODIPINE BESYLATE 10 MILLIGRAM(S): 2.5 TABLET ORAL at 05:02

## 2021-11-03 RX ADMIN — MORPHINE SULFATE 2 MILLIGRAM(S): 50 CAPSULE, EXTENDED RELEASE ORAL at 00:03

## 2021-11-03 RX ADMIN — Medication 1 SPRAY(S): at 21:19

## 2021-11-03 RX ADMIN — MORPHINE SULFATE 2 MILLIGRAM(S): 50 CAPSULE, EXTENDED RELEASE ORAL at 06:26

## 2021-11-03 RX ADMIN — PIPERACILLIN AND TAZOBACTAM 25 GRAM(S): 4; .5 INJECTION, POWDER, LYOPHILIZED, FOR SOLUTION INTRAVENOUS at 13:21

## 2021-11-03 RX ADMIN — SODIUM CHLORIDE 150 MILLILITER(S): 9 INJECTION, SOLUTION INTRAVENOUS at 03:43

## 2021-11-03 RX ADMIN — HEPARIN SODIUM 5000 UNIT(S): 5000 INJECTION INTRAVENOUS; SUBCUTANEOUS at 05:03

## 2021-11-03 RX ADMIN — LISINOPRIL 40 MILLIGRAM(S): 2.5 TABLET ORAL at 05:02

## 2021-11-03 RX ADMIN — SODIUM CHLORIDE 150 MILLILITER(S): 9 INJECTION, SOLUTION INTRAVENOUS at 11:37

## 2021-11-03 RX ADMIN — MORPHINE SULFATE 2 MILLIGRAM(S): 50 CAPSULE, EXTENDED RELEASE ORAL at 05:02

## 2021-11-03 RX ADMIN — PANTOPRAZOLE SODIUM 40 MILLIGRAM(S): 20 TABLET, DELAYED RELEASE ORAL at 05:03

## 2021-11-03 RX ADMIN — HEPARIN SODIUM 5000 UNIT(S): 5000 INJECTION INTRAVENOUS; SUBCUTANEOUS at 21:19

## 2021-11-03 RX ADMIN — ONDANSETRON 4 MILLIGRAM(S): 8 TABLET, FILM COATED ORAL at 13:34

## 2021-11-03 RX ADMIN — MORPHINE SULFATE 2 MILLIGRAM(S): 50 CAPSULE, EXTENDED RELEASE ORAL at 06:45

## 2021-11-03 NOTE — PROGRESS NOTE ADULT - SUBJECTIVE AND OBJECTIVE BOX
S:  Pt reports feeling more bloated in upper abdomen with burping, since MN.  He is having flatus and passing watery BM's overnight and this AM though. No N/V  O; Vital Signs Last 24 Hrs  T(C): 36.4 (03 Nov 2021 04:33), Max: 37.3 (02 Nov 2021 21:32)  T(F): 97.6 (03 Nov 2021 04:33), Max: 99.2 (02 Nov 2021 21:32)  HR: 95 (03 Nov 2021 04:33) (95 - 104)  BP: 143/98 (03 Nov 2021 04:33) (129/84 - 143/98)  BP(mean): --  RR: 18 (03 Nov 2021 04:33) (18 - 18)  SpO2: --    MEDICATIONS  (STANDING):  amLODIPine   Tablet 10 milliGRAM(s) Oral daily  chlorhexidine 4% Liquid 1 Application(s) Topical <User Schedule>  folic acid 1 milliGRAM(s) Oral daily  heparin   Injectable 5000 Unit(s) SubCutaneous every 8 hours  lactated ringers. 1000 milliLiter(s) (150 mL/Hr) IV Continuous <Continuous>  lisinopril 40 milliGRAM(s) Oral daily  pantoprazole    Tablet 40 milliGRAM(s) Oral before breakfast  piperacillin/tazobactam IVPB.. 3.375 Gram(s) IV Intermittent every 8 hours  thiamine 100 milliGRAM(s) Oral daily    MEDICATIONS  (PRN):  acetaminophen     Tablet .. 650 milliGRAM(s) Oral every 6 hours PRN Temp greater or equal to 38C (100.4F)  chlordiazePOXIDE 25 milliGRAM(s) Oral every 4 hours PRN Alcohol Withdrawal Symptoms  hydrOXYzine hydrochloride 50 milliGRAM(s) Oral every 6 hours PRN Anxiety etoh WD  morphine  - Injectable 2 milliGRAM(s) IV Push every 6 hours PRN Severe Pain (7 - 10)  ondansetron Injectable 4 milliGRAM(s) IV Push every 8 hours PRN Nausea and/or Vomiting  simethicone 80 milliGRAM(s) Chew every 6 hours PRN Gas  traZODone 50 milliGRAM(s) Oral at bedtime PRN sleep    IV Fluids:  lactated ringers.: Solution, 1000 milliLiter(s) infuse at 150 mL/Hr    EXAM:  lungs: cta  cvs: s1s2, slightly tachycardic  abd: distended, firm w/ S:  Pt reports feeling more bloated in upper abdomen with burping, since MN.  He is having flatus and passing watery BM's overnight and this AM though. No N/V  O; Vital Signs Last 24 Hrs  T(C): 36.4 (03 Nov 2021 04:33), Max: 37.3 (02 Nov 2021 21:32)  T(F): 97.6 (03 Nov 2021 04:33), Max: 99.2 (02 Nov 2021 21:32)  HR: 95 (03 Nov 2021 04:33) (95 - 104)  BP: 143/98 (03 Nov 2021 04:33) (129/84 - 143/98)  BP(mean): --  RR: 18 (03 Nov 2021 04:33) (18 - 18)  SpO2: --    MEDICATIONS  (STANDING):  amLODIPine   Tablet 10 milliGRAM(s) Oral daily  chlorhexidine 4% Liquid 1 Application(s) Topical <User Schedule>  folic acid 1 milliGRAM(s) Oral daily  heparin   Injectable 5000 Unit(s) SubCutaneous every 8 hours  lactated ringers. 1000 milliLiter(s) (150 mL/Hr) IV Continuous <Continuous>  lisinopril 40 milliGRAM(s) Oral daily  pantoprazole    Tablet 40 milliGRAM(s) Oral before breakfast  piperacillin/tazobactam IVPB.. 3.375 Gram(s) IV Intermittent every 8 hours  thiamine 100 milliGRAM(s) Oral daily    MEDICATIONS  (PRN):  acetaminophen     Tablet .. 650 milliGRAM(s) Oral every 6 hours PRN Temp greater or equal to 38C (100.4F)  chlordiazePOXIDE 25 milliGRAM(s) Oral every 4 hours PRN Alcohol Withdrawal Symptoms  hydrOXYzine hydrochloride 50 milliGRAM(s) Oral every 6 hours PRN Anxiety etoh WD  morphine  - Injectable 2 milliGRAM(s) IV Push every 6 hours PRN Severe Pain (7 - 10)  ondansetron Injectable 4 milliGRAM(s) IV Push every 8 hours PRN Nausea and/or Vomiting  simethicone 80 milliGRAM(s) Chew every 6 hours PRN Gas  traZODone 50 milliGRAM(s) Oral at bedtime PRN sleep    IV Fluids:  lactated ringers.: Solution, 1000 milliLiter(s) infuse at 150 mL/Hr    EXAM:  lungs: cta  cvs: s1s2, slightly tachycardic  abd: distended, firm w/mild generalized tenderness +tympanic. no BS appreciated    Labs:  CAPILLARY BLOOD GLUCOSE                              14.9   7.53  )-----------( 241      ( 03 Nov 2021 07:48 )             44.0         11-03    136  |  98  |  22<H>  ----------------------------<  102<H>  3.2<L>   |  24  |  0.9      Calcium, Total Serum: 8.8 mg/dL (11-03-21 @ 07:48)      LFTs:     Lactate, Blood: 1.1 mmol/L (11-01-21 @ 11:13)  Lactate, Blood: 4.6 mmol/L (11-01-21 @ 09:00)      Coags:    RADIOLOGY:  KUB: pending results Oriented - self; Oriented - place; Oriented - time

## 2021-11-03 NOTE — CHART NOTE - NSCHARTNOTEFT_GEN_A_CORE
On afternoon rounds, patient admits to still being nauseas and vomiting again. He does report that he is still passing gas. Decision made to place NGT. Abdomen still distended. 900 cc bilious output returned. Made strict NPO. Will keep NGT to low continuous suction. CXR pending.

## 2021-11-04 LAB
ANION GAP SERPL CALC-SCNC: 11 MMOL/L — SIGNIFICANT CHANGE UP (ref 7–14)
BUN SERPL-MCNC: 24 MG/DL — HIGH (ref 10–20)
CALCIUM SERPL-MCNC: 8.4 MG/DL — LOW (ref 8.5–10.1)
CHLORIDE SERPL-SCNC: 102 MMOL/L — SIGNIFICANT CHANGE UP (ref 98–110)
CO2 SERPL-SCNC: 27 MMOL/L — SIGNIFICANT CHANGE UP (ref 17–32)
CREAT SERPL-MCNC: 1.1 MG/DL — SIGNIFICANT CHANGE UP (ref 0.7–1.5)
GLUCOSE SERPL-MCNC: 110 MG/DL — HIGH (ref 70–99)
HCT VFR BLD CALC: 37.9 % — LOW (ref 42–52)
HGB BLD-MCNC: 12.9 G/DL — LOW (ref 14–18)
MAGNESIUM SERPL-MCNC: 2 MG/DL — SIGNIFICANT CHANGE UP (ref 1.8–2.4)
MCHC RBC-ENTMCNC: 31.5 PG — HIGH (ref 27–31)
MCHC RBC-ENTMCNC: 34 G/DL — SIGNIFICANT CHANGE UP (ref 32–37)
MCV RBC AUTO: 92.4 FL — SIGNIFICANT CHANGE UP (ref 80–94)
NRBC # BLD: 0 /100 WBCS — SIGNIFICANT CHANGE UP (ref 0–0)
PHOSPHATE SERPL-MCNC: 2.5 MG/DL — SIGNIFICANT CHANGE UP (ref 2.1–4.9)
PLATELET # BLD AUTO: 272 K/UL — SIGNIFICANT CHANGE UP (ref 130–400)
POTASSIUM SERPL-MCNC: 3 MMOL/L — LOW (ref 3.5–5)
POTASSIUM SERPL-SCNC: 3 MMOL/L — LOW (ref 3.5–5)
RBC # BLD: 4.1 M/UL — LOW (ref 4.7–6.1)
RBC # FLD: 13 % — SIGNIFICANT CHANGE UP (ref 11.5–14.5)
SODIUM SERPL-SCNC: 140 MMOL/L — SIGNIFICANT CHANGE UP (ref 135–146)
WBC # BLD: 15.07 K/UL — HIGH (ref 4.8–10.8)
WBC # FLD AUTO: 15.07 K/UL — HIGH (ref 4.8–10.8)

## 2021-11-04 PROCEDURE — 74021 RADEX ABDOMEN 3+ VIEWS: CPT | Mod: 26

## 2021-11-04 PROCEDURE — 99232 SBSQ HOSP IP/OBS MODERATE 35: CPT

## 2021-11-04 RX ORDER — POTASSIUM CHLORIDE 20 MEQ
20 PACKET (EA) ORAL
Refills: 0 | Status: DISCONTINUED | OUTPATIENT
Start: 2021-11-04 | End: 2021-11-04

## 2021-11-04 RX ORDER — POTASSIUM CHLORIDE 20 MEQ
20 PACKET (EA) ORAL ONCE
Refills: 0 | Status: DISCONTINUED | OUTPATIENT
Start: 2021-11-04 | End: 2021-11-04

## 2021-11-04 RX ORDER — POTASSIUM CHLORIDE 20 MEQ
20 PACKET (EA) ORAL
Refills: 0 | Status: COMPLETED | OUTPATIENT
Start: 2021-11-04 | End: 2021-11-05

## 2021-11-04 RX ORDER — LISINOPRIL 2.5 MG/1
40 TABLET ORAL DAILY
Refills: 0 | Status: DISCONTINUED | OUTPATIENT
Start: 2021-11-04 | End: 2021-11-06

## 2021-11-04 RX ORDER — AMLODIPINE BESYLATE 2.5 MG/1
10 TABLET ORAL DAILY
Refills: 0 | Status: DISCONTINUED | OUTPATIENT
Start: 2021-11-04 | End: 2021-11-06

## 2021-11-04 RX ADMIN — PIPERACILLIN AND TAZOBACTAM 25 GRAM(S): 4; .5 INJECTION, POWDER, LYOPHILIZED, FOR SOLUTION INTRAVENOUS at 05:16

## 2021-11-04 RX ADMIN — Medication 100 MILLIEQUIVALENT(S): at 21:56

## 2021-11-04 RX ADMIN — LISINOPRIL 40 MILLIGRAM(S): 2.5 TABLET ORAL at 13:23

## 2021-11-04 RX ADMIN — HEPARIN SODIUM 5000 UNIT(S): 5000 INJECTION INTRAVENOUS; SUBCUTANEOUS at 05:16

## 2021-11-04 RX ADMIN — AMLODIPINE BESYLATE 10 MILLIGRAM(S): 2.5 TABLET ORAL at 13:23

## 2021-11-04 RX ADMIN — PANTOPRAZOLE SODIUM 40 MILLIGRAM(S): 20 TABLET, DELAYED RELEASE ORAL at 11:08

## 2021-11-04 RX ADMIN — PIPERACILLIN AND TAZOBACTAM 25 GRAM(S): 4; .5 INJECTION, POWDER, LYOPHILIZED, FOR SOLUTION INTRAVENOUS at 21:52

## 2021-11-04 RX ADMIN — Medication 15 MILLIGRAM(S): at 19:36

## 2021-11-04 RX ADMIN — Medication 15 MILLIGRAM(S): at 20:49

## 2021-11-04 RX ADMIN — PIPERACILLIN AND TAZOBACTAM 25 GRAM(S): 4; .5 INJECTION, POWDER, LYOPHILIZED, FOR SOLUTION INTRAVENOUS at 13:23

## 2021-11-04 NOTE — PROGRESS NOTE ADULT - SUBJECTIVE AND OBJECTIVE BOX
Progress Note: General Surgery  Patient: PRASANNA MARTINO , 40y (1980)Male   MRN: [id number]  Location: [place] [address]  Visit: 11-01-21 Inpatient  Date: 11-04-21 @ 13:43    Admit Diagnosis/Chief Complaint: Perforated appendicitis, nonsurgical management with IV antibiotics.  patient developed ileus, was having emesis yesterday and required NGT.  About 1500 cc of bilious output since insertion. (initial 900, 600 overnight)  This morning patient reports feeling much better. He is soft, he is still mildly distended. Nontender. Having gas and bowel movements.   Encouraged patient to allow NGT to remain to suction for some more time, but he was adamant about removal. We explained the risks to removal, and how we may need to replace the tube if he vomits again. Patient understands, NGT removed.       Vitals: T(F): 96.3 (11-04-21 @ 12:58), Max: 97.9 (11-04-21 @ 04:54)  HR: 100 (11-04-21 @ 12:58)  BP: 160/96 (11-04-21 @ 12:58) (135/91 - 160/96)  RR: 16 (11-04-21 @ 12:58)  SpO2: --    In:   11-03-21 @ 07:01  -  11-04-21 @ 07:00  --------------------------------------------------------  IN: 0 mL      Out:   11-03-21 @ 07:01  -  11-04-21 @ 07:00  --------------------------------------------------------  OUT:    Nasogastric/Oral tube (mL): 1360 mL  Total OUT: 1360 mL        Net:   11-03-21 @ 07:01  -  11-04-21 @ 07:00  --------------------------------------------------------  NET: -1360 mL        Diet: Diet, NPO (11-03-21 @ 17:39)    IV Fluids: lactated ringers. 1000 milliLiter(s) (150 mL/Hr) IV Continuous <Continuous>      Physical Examination:  General Appearance: NAD  HEENT: EOMI, sclera non-icteric.  Heart: RRR   Lungs: CTABL.   Abdomen:  Soft, nontender, mildly distended.   MSK/Extremities: Warm & well-perfused.   Skin: Warm, dry. No jaundice.       Medications: [Standing]  amLODIPine   Tablet 10 milliGRAM(s) Oral daily  chlorhexidine 4% Liquid 1 Application(s) Topical <User Schedule>  heparin   Injectable 5000 Unit(s) SubCutaneous every 8 hours  lactated ringers. 1000 milliLiter(s) (150 mL/Hr) IV Continuous <Continuous>  lisinopril 40 milliGRAM(s) Oral daily  pantoprazole  Injectable 40 milliGRAM(s) IV Push daily  piperacillin/tazobactam IVPB.. 3.375 Gram(s) IV Intermittent every 8 hours    DVT Prophylaxis: heparin   Injectable 5000 Unit(s) SubCutaneous every 8 hours    GI Prophylaxis: pantoprazole  Injectable 40 milliGRAM(s) IV Push daily    Antibiotics: piperacillin/tazobactam IVPB.. 3.375 Gram(s) IV Intermittent every 8 hours    Anticoagulation:   Medications:[PRN]  benzocaine 20% Spray 1 Spray(s) Topical every 4 hours PRN  ketorolac   Injectable 15 milliGRAM(s) IV Push every 6 hours PRN  ondansetron Injectable 4 milliGRAM(s) IV Push every 8 hours PRN      Labs:                        14.9   7.53  )-----------( 241      ( 03 Nov 2021 07:48 )             44.0     11-03    136  |  98  |  22<H>  ----------------------------<  102<H>  3.2<L>   |  24  |  0.9    Ca    8.8      03 Nov 2021 07:48  Phos  3.2     11-03  Mg     1.9     11-03          Imaging:   Xray Chest 1 View- PORTABLE-Urgent:   EXAM:  XR CHEST PORTABLE URGENT 1V          PROCEDURE DATE:  11/03/2021      INTERPRETATION:  Clinical History / Reason for exam: Feeding tube placement    Comparison : Chest radiograph None.    Technique/Positioning: Frontal, portable.    Findings:    Support devices: Feeding tube tip is seen overlying the left upper quadrant.    Cardiac/mediastinum/hilum: Unremarkable.    Lung parenchyma/Pleura: Within normal limits.    Skeleton/soft tissues: Unremarkable for age. There are dilated loops of small bowel in the partially imaged abdomen.    Impression:    No radiographic evidence of acute cardiopulmonary disease.    Dilated loops of small bowel in the partially imaged abdomen    --- End of Report ---          PORTER BROOKS MD; Attending Radiologist  This document has been electronically signed. Nov 4 2021 11:52AM (11-03-21 @ 17:52)

## 2021-11-04 NOTE — CHART NOTE - NSCHARTNOTEFT_GEN_A_CORE
Reviewed obstructive series. Small bowel still dilated with air fluid levels. Clinically, patient reports that he feels well and abdominal exam unchanged from this morning.  I did recommend NGT reinsertion, as the bowel is still grossly dilated, but patient refused. Patient agreed to strict NPO. Will continue to reassess

## 2021-11-05 LAB
ANION GAP SERPL CALC-SCNC: 13 MMOL/L — SIGNIFICANT CHANGE UP (ref 7–14)
BUN SERPL-MCNC: 19 MG/DL — SIGNIFICANT CHANGE UP (ref 10–20)
CALCIUM SERPL-MCNC: 9 MG/DL — SIGNIFICANT CHANGE UP (ref 8.5–10.1)
CHLORIDE SERPL-SCNC: 100 MMOL/L — SIGNIFICANT CHANGE UP (ref 98–110)
CO2 SERPL-SCNC: 26 MMOL/L — SIGNIFICANT CHANGE UP (ref 17–32)
CREAT SERPL-MCNC: 1 MG/DL — SIGNIFICANT CHANGE UP (ref 0.7–1.5)
GLUCOSE SERPL-MCNC: 81 MG/DL — SIGNIFICANT CHANGE UP (ref 70–99)
HCT VFR BLD CALC: 41.1 % — LOW (ref 42–52)
HGB BLD-MCNC: 14 G/DL — SIGNIFICANT CHANGE UP (ref 14–18)
MAGNESIUM SERPL-MCNC: 2 MG/DL — SIGNIFICANT CHANGE UP (ref 1.8–2.4)
MCHC RBC-ENTMCNC: 31.5 PG — HIGH (ref 27–31)
MCHC RBC-ENTMCNC: 34.1 G/DL — SIGNIFICANT CHANGE UP (ref 32–37)
MCV RBC AUTO: 92.4 FL — SIGNIFICANT CHANGE UP (ref 80–94)
NRBC # BLD: 0 /100 WBCS — SIGNIFICANT CHANGE UP (ref 0–0)
PHOSPHATE SERPL-MCNC: 2.8 MG/DL — SIGNIFICANT CHANGE UP (ref 2.1–4.9)
PLATELET # BLD AUTO: 323 K/UL — SIGNIFICANT CHANGE UP (ref 130–400)
POTASSIUM SERPL-MCNC: 3.7 MMOL/L — SIGNIFICANT CHANGE UP (ref 3.5–5)
POTASSIUM SERPL-SCNC: 3.7 MMOL/L — SIGNIFICANT CHANGE UP (ref 3.5–5)
RBC # BLD: 4.45 M/UL — LOW (ref 4.7–6.1)
RBC # FLD: 13.2 % — SIGNIFICANT CHANGE UP (ref 11.5–14.5)
SODIUM SERPL-SCNC: 139 MMOL/L — SIGNIFICANT CHANGE UP (ref 135–146)
WBC # BLD: 17.23 K/UL — HIGH (ref 4.8–10.8)
WBC # FLD AUTO: 17.23 K/UL — HIGH (ref 4.8–10.8)

## 2021-11-05 PROCEDURE — 99232 SBSQ HOSP IP/OBS MODERATE 35: CPT

## 2021-11-05 PROCEDURE — 74019 RADEX ABDOMEN 2 VIEWS: CPT | Mod: 26

## 2021-11-05 PROCEDURE — 74177 CT ABD & PELVIS W/CONTRAST: CPT | Mod: 26

## 2021-11-05 RX ORDER — IOHEXOL 300 MG/ML
30 INJECTION, SOLUTION INTRAVENOUS ONCE
Refills: 0 | Status: COMPLETED | OUTPATIENT
Start: 2021-11-05 | End: 2021-11-05

## 2021-11-05 RX ADMIN — PIPERACILLIN AND TAZOBACTAM 25 GRAM(S): 4; .5 INJECTION, POWDER, LYOPHILIZED, FOR SOLUTION INTRAVENOUS at 13:36

## 2021-11-05 RX ADMIN — Medication 100 MILLIEQUIVALENT(S): at 00:24

## 2021-11-05 RX ADMIN — PIPERACILLIN AND TAZOBACTAM 25 GRAM(S): 4; .5 INJECTION, POWDER, LYOPHILIZED, FOR SOLUTION INTRAVENOUS at 21:53

## 2021-11-05 RX ADMIN — AMLODIPINE BESYLATE 10 MILLIGRAM(S): 2.5 TABLET ORAL at 05:17

## 2021-11-05 RX ADMIN — Medication 100 MILLIEQUIVALENT(S): at 01:55

## 2021-11-05 RX ADMIN — PIPERACILLIN AND TAZOBACTAM 25 GRAM(S): 4; .5 INJECTION, POWDER, LYOPHILIZED, FOR SOLUTION INTRAVENOUS at 05:18

## 2021-11-05 RX ADMIN — IOHEXOL 30 MILLILITER(S): 300 INJECTION, SOLUTION INTRAVENOUS at 11:54

## 2021-11-05 RX ADMIN — PANTOPRAZOLE SODIUM 40 MILLIGRAM(S): 20 TABLET, DELAYED RELEASE ORAL at 11:29

## 2021-11-05 RX ADMIN — LISINOPRIL 40 MILLIGRAM(S): 2.5 TABLET ORAL at 05:18

## 2021-11-05 NOTE — PROGRESS NOTE ADULT - ATTENDING COMMENTS
Afebrile. Feels marginally improved from yest; still with some epigastric pain. No nausea or vomiting with sips liquid. Passing stool and flatus. denies tremulousness. Exam stable from prior, with mild epigastric tenderness and no rebound or guarding. WBC ct, Cr both improved. Plan to cont bowel rest (ice chips, sips liquids permitted), broad spec abx, serial exams, DVT ppx.
Afebrile. Little to no abdominal pain this morning; distention has greatly subsided with NG tube insertion. Initial output was bilious and high (900cc), but this has tapered off and this morning only scant amount bile tinged output present. Bowel fxn continues. Pt denies f/c and has been ambulaitng. He very much wants NG removed. Labs and f/u AXR pending. Will remove NG, encourage ambulation, cont IV abx and bolus 1L crystalloid this afternoon in lieu of low tachycardia 100s (suspect dehydration from GI losses). Commence home antihypertensives; if bowel dilation improving on AXR and pt cont to have bowel fxn, consider trial of sips CLD this evening.
Afebrile. No nausea or emesis, despite KUB findings. Denies abdominal pain. Passing stool and flatus. Exam with distended, nontender abdomen. WBC ct up to 15. Plan for repeat CT ap to eval for progression of ileus vs pSBO and to eval for abscess in lieu of worsening leukocytosis. Cont abx and keep NPO for now. We have emphasized to the pt multiple times that NG tube is necessary at this point to enhance odds of successful recovery with nonop management, however, he declines.
Afebrile; worsening distention and pt experienced emesis this morning which was NBNB. He felt better afterward; pain is improving since yest. Bowel fxn continues. Exam with distention and minimal diffuse tenderness without peritonitis. WBC ct normal. XR with no read yet, but apparent ileus. Plan to keep NPO except ice chips; cont broad spec abx; DVT ppx and ambulation. Suspect mild ileus which will resolve in coming days; pt was advised if further emesis is experienced, NG tube placement will be necessary and he is amenable to this.

## 2021-11-05 NOTE — PROGRESS NOTE ADULT - SUBJECTIVE AND OBJECTIVE BOX
Progress Note: General Surgery  Patient: PRASANNA MARTINO , 40y (1980)Male   MRN: [id number]  Location: [address] [address]       Admit Diagnosis/Chief Complaint: Perforated appendicitis, nonsurgical management with IV antibiotics.  patient developed ileus,   this am reports NO n/v / belching wants to eat   denies chills fever  had 3 BM loose         Vital Signs Last 24 Hrs  T(C): 36.3 (05 Nov 2021 04:50), Max: 37 (04 Nov 2021 21:35)  T(F): 97.3 (05 Nov 2021 04:50), Max: 98.6 (04 Nov 2021 21:35)  HR: 99 (05 Nov 2021 04:50) (95 - 100)  BP: 157/102 (05 Nov 2021 04:50) (140/92 - 160/96)  RR: 16 (05 Nov 2021 04:50) (16 - 16)      I&O's Detail          Diet: Diet, NPO (11-03-21 @ 17:39)    IV Fluids: lactated ringers. 1000 milliLiter(s) (150 mL/Hr) IV Continuous <Continuous>      Physical Examination:  General Appearance: NAD  HEENT: EOMI, sclera non-icteric.  Heart: RRR   Lungs: CTABL.   Abdomen:  Soft, nontender, mildly distended.   MSK/Extremities: Warm & well-perfused.   Skin: Warm, dry. No jaundice.       Medications: [Standing]  amLODIPine   Tablet 10 milliGRAM(s) Oral daily  chlorhexidine 4% Liquid 1 Application(s) Topical <User Schedule>  heparin   Injectable 5000 Unit(s) SubCutaneous every 8 hours  lactated ringers. 1000 milliLiter(s) (150 mL/Hr) IV Continuous <Continuous>  lisinopril 40 milliGRAM(s) Oral daily  pantoprazole  Injectable 40 milliGRAM(s) IV Push daily  piperacillin/tazobactam IVPB.. 3.375 Gram(s) IV Intermittent every 8 hours    DVT Prophylaxis: heparin   Injectable 5000 Unit(s) SubCutaneous every 8 hours    GI Prophylaxis: pantoprazole  Injectable 40 milliGRAM(s) IV Push daily    Antibiotics: piperacillin/tazobactam IVPB.. 3.375 Gram(s) IV Intermittent every 8 hours    Anticoagulation:   Medications:[PRN]  benzocaine 20% Spray 1 Spray(s) Topical every 4 hours PRN  ketorolac   Injectable 15 milliGRAM(s) IV Push every 6 hours PRN  ondansetron Injectable 4 milliGRAM(s) IV Push every 8 hours PRN      Labs:               11-05    139  |  100  |  19  ----------------------------<  81  3.7   |  26  |  1.0    Ca    9.0      05 Nov 2021 07:13  Phos  2.8     11-05  Mg     2.0     11-05                              14.0   17.23 )-----------( 323      ( 05 Nov 2021 07:13 )             41.1     CAPILLARY BLOOD GLUCOSE              Imaging:   Xray Chest 1 View- PORTABLE-Urgent:   EXAM:  XR CHEST PORTABLE URGENT 1V          PROCEDURE DATE:  11/03/2021      INTERPRETATION:  Clinical History / Reason for exam: Feeding tube placement    Comparison : Chest radiograph None.    Technique/Positioning: Frontal, portable.    Findings:    Support devices: Feeding tube tip is seen overlying the left upper quadrant.    Cardiac/mediastinum/hilum: Unremarkable.    Lung parenchyma/Pleura: Within normal limits.    Skeleton/soft tissues: Unremarkable for age. There are dilated loops of small bowel in the partially imaged abdomen.    Impression:    No radiographic evidence of acute cardiopulmonary disease.    Dilated loops of small bowel in the partially imaged abdomen    --- End of Report ---          PORTER BROOKS MD; Attending Radiologist  This document has been electronically signed. Nov 4 2021 11:52AM (11-03-21 @ 17:52)

## 2021-11-06 ENCOUNTER — TRANSCRIPTION ENCOUNTER (OUTPATIENT)
Age: 41
End: 2021-11-06

## 2021-11-06 VITALS
HEART RATE: 99 BPM | DIASTOLIC BLOOD PRESSURE: 97 MMHG | RESPIRATION RATE: 16 BRPM | TEMPERATURE: 98 F | SYSTOLIC BLOOD PRESSURE: 168 MMHG

## 2021-11-06 LAB
ANION GAP SERPL CALC-SCNC: 14 MMOL/L — SIGNIFICANT CHANGE UP (ref 7–14)
BUN SERPL-MCNC: 13 MG/DL — SIGNIFICANT CHANGE UP (ref 10–20)
CALCIUM SERPL-MCNC: 8.7 MG/DL — SIGNIFICANT CHANGE UP (ref 8.5–10.1)
CHLORIDE SERPL-SCNC: 101 MMOL/L — SIGNIFICANT CHANGE UP (ref 98–110)
CO2 SERPL-SCNC: 24 MMOL/L — SIGNIFICANT CHANGE UP (ref 17–32)
CREAT SERPL-MCNC: 0.9 MG/DL — SIGNIFICANT CHANGE UP (ref 0.7–1.5)
GLUCOSE SERPL-MCNC: 90 MG/DL — SIGNIFICANT CHANGE UP (ref 70–99)
HCT VFR BLD CALC: 40 % — LOW (ref 42–52)
HGB BLD-MCNC: 13.5 G/DL — LOW (ref 14–18)
MAGNESIUM SERPL-MCNC: 1.9 MG/DL — SIGNIFICANT CHANGE UP (ref 1.8–2.4)
MCHC RBC-ENTMCNC: 30.9 PG — SIGNIFICANT CHANGE UP (ref 27–31)
MCHC RBC-ENTMCNC: 33.8 G/DL — SIGNIFICANT CHANGE UP (ref 32–37)
MCV RBC AUTO: 91.5 FL — SIGNIFICANT CHANGE UP (ref 80–94)
NRBC # BLD: 0 /100 WBCS — SIGNIFICANT CHANGE UP (ref 0–0)
PLATELET # BLD AUTO: 342 K/UL — SIGNIFICANT CHANGE UP (ref 130–400)
POTASSIUM SERPL-MCNC: 3.5 MMOL/L — SIGNIFICANT CHANGE UP (ref 3.5–5)
POTASSIUM SERPL-SCNC: 3.5 MMOL/L — SIGNIFICANT CHANGE UP (ref 3.5–5)
RBC # BLD: 4.37 M/UL — LOW (ref 4.7–6.1)
RBC # FLD: 13.1 % — SIGNIFICANT CHANGE UP (ref 11.5–14.5)
SODIUM SERPL-SCNC: 139 MMOL/L — SIGNIFICANT CHANGE UP (ref 135–146)
WBC # BLD: 17.87 K/UL — HIGH (ref 4.8–10.8)
WBC # FLD AUTO: 17.87 K/UL — HIGH (ref 4.8–10.8)

## 2021-11-06 RX ADMIN — PIPERACILLIN AND TAZOBACTAM 25 GRAM(S): 4; .5 INJECTION, POWDER, LYOPHILIZED, FOR SOLUTION INTRAVENOUS at 05:01

## 2021-11-06 RX ADMIN — AMLODIPINE BESYLATE 10 MILLIGRAM(S): 2.5 TABLET ORAL at 05:01

## 2021-11-06 RX ADMIN — LISINOPRIL 40 MILLIGRAM(S): 2.5 TABLET ORAL at 05:01

## 2021-11-06 RX ADMIN — CHLORHEXIDINE GLUCONATE 1 APPLICATION(S): 213 SOLUTION TOPICAL at 05:01

## 2021-11-06 RX ADMIN — SODIUM CHLORIDE 150 MILLILITER(S): 9 INJECTION, SOLUTION INTRAVENOUS at 04:49

## 2021-11-06 NOTE — PROGRESS NOTE ADULT - SUBJECTIVE AND OBJECTIVE BOX
Progress Note: General Surgery  Patient: PRASANNA MARTINO , 40y (1980)Male   MRN: [id number]  Location: [place] [address]      Admit Diagnosis/Chief Complaint: Perforated appendicitis, nonsurgical management with IV antibiotics.  patient developed ileus,   this am reports NO n/v / belching,  wants to eat   denies chills fever  had BM loose ( post po contrast )       Vital Signs Last 24 Hrs  T(C): 36.8 (06 Nov 2021 05:00), Max: 37 (05 Nov 2021 21:00)  T(F): 98.2 (06 Nov 2021 05:00), Max: 98.6 (05 Nov 2021 21:00)  HR: 99 (06 Nov 2021 05:00) (89 - 101)  BP: 168/97 (06 Nov 2021 05:00) (147/79 - 168/97)  BP(mean): --  RR: 16 (06 Nov 2021 05:00) (16 - 16)      Diet: Diet, NPO (11-03-21 @ 17:39)    IV Fluids: lactated ringers. 1000 milliLiter(s) (150 mL/Hr) IV Continuous <Continuous>      Physical Examination:  General Appearance: NAD  HEENT: EOMI, sclera non-icteric.  Heart: RRR   Lungs: CTABL.   Abdomen:  Soft, nontender, mildly distended.   MSK/Extremities: Warm & well-perfused.   Skin: Warm, dry. No jaundice.       Medications: [Standing]  amLODIPine   Tablet 10 milliGRAM(s) Oral daily  chlorhexidine 4% Liquid 1 Application(s) Topical <User Schedule>  heparin   Injectable 5000 Unit(s) SubCutaneous every 8 hours  lactated ringers. 1000 milliLiter(s) (150 mL/Hr) IV Continuous <Continuous>  lisinopril 40 milliGRAM(s) Oral daily  pantoprazole  Injectable 40 milliGRAM(s) IV Push daily  piperacillin/tazobactam IVPB.. 3.375 Gram(s) IV Intermittent every 8 hours    DVT Prophylaxis: heparin   Injectable 5000 Unit(s) SubCutaneous every 8 hours    GI Prophylaxis: pantoprazole  Injectable 40 milliGRAM(s) IV Push daily    Antibiotics: piperacillin/tazobactam IVPB.. 3.375 Gram(s) IV Intermittent every 8 hours    Anticoagulation:   Medications:[PRN]  benzocaine 20% Spray 1 Spray(s) Topical every 4 hours PRN  ketorolac   Injectable 15 milliGRAM(s) IV Push every 6 hours PRN  ondansetron Injectable 4 milliGRAM(s) IV Push every 8 hours PRN      Labs:              pending      < from: CT Abdomen and Pelvis w/ Oral Cont and w/ IV Cont (11.05.21 @ 15:18) >    EXAM:  CT ABDOMEN AND PELVIS OC IC            PROCEDURE DATE:  11/05/2021            INTERPRETATION:  CLINICAL STATEMENT: Perforated appendicitis. Small bowel obstruction. Abscess    TECHNIQUE: Contiguous axial CT images were obtained from the lower chest to the pubic symphysis following administration of 100cc Optiray 320 intravenous contrast.  Oral contrast was administered.  Reformatted images in the coronal and sagittal planes were acquired.    Comparison made with CT abdomen and pelvis November 1, 2021, abdominal x-ray November 5, 2021.    FINDINGS:    LOWER CHEST: New trace right pleural effusion. Bibasilar subsegmental atelectasis.    HEPATOBILIARY: Gallbladder sludge versus previously vicarious excreted contrast. Liver unremarkable.    SPLEEN: Unremarkable.    PANCREAS: Unremarkable.    ADRENAL GLANDS: Unremarkable.    KIDNEYS: Unremarkable.    ABDOMINOPELVIC NODES: Unremarkable.    PELVIC ORGANS: Unremarkable.    PERITONEUM/MESENTERY/BOWEL: Persistent abnormal dilation of appendix, up to 1.4 cm with increased wall enhancement, periappendiceal phlegmon. Right central mesenteric loculated fluid-gas collection now more organized, with partial peripheral rim enhancement, measuring 4.9 x 2.8 cm, previously 3.2 x 2.6 cm (series 4, image 243). New 2.8 x 2 cm abscess, directly contiguous with appendix (series 4, image 245). No significant change in marked abnormal small bowel dilation, up to 5 cm, with caliber change at level of right central mesenteric fluid collection. Sigmoid diffuse wall and distal ileal wall hyperenhancement, likely reactive. Increased mild ascites. Oral contrast has reached the mid small bowel, and appears to be diluted.    BONES/SOFT TISSUES: Osseous structures unremarkable.      IMPRESSION:  Since November 1, 2021:    1. Perforated appendicitis. New 2.8 x 2 cm periappendiceal abscess. Previously described right central mesenteric loculated fluid-gas collection now more organized, with partial peripheral rim enhancement, measuring 4.9 x 2.8 cm, previously 3.2 x 2.6 cm, compatible with developing abscess.    2. No significant change in marked abnormal small bowel dilation, up to 5 cm, with caliber change at level of right central mesenteric fluid collection. Although findings may represent small bowel obstruction, given location and context, reactive ileus is also a strong possibility. Follow-up abdominal x-rays may be helpful, however there is already dilution of oral contrast within dilated proximal small bowel (CT may be better).    3. Sigmoid diffuse wall and distal ileal wall hyperenhancement, likely reactive. Increased mild ascites.    --- End of Report ---    < end of copied text >                Imaging:   Xray Chest 1 View- PORTABLE-Urgent:   EXAM:  XR CHEST PORTABLE URGENT 1V          PROCEDURE DATE:  11/03/2021      INTERPRETATION:  Clinical History / Reason for exam: Feeding tube placement    Comparison : Chest radiograph None.    Technique/Positioning: Frontal, portable.    Findings:    Support devices: Feeding tube tip is seen overlying the left upper quadrant.    Cardiac/mediastinum/hilum: Unremarkable.    Lung parenchyma/Pleura: Within normal limits.    Skeleton/soft tissues: Unremarkable for age. There are dilated loops of small bowel in the partially imaged abdomen.    Impression:    No radiographic evidence of acute cardiopulmonary disease.    Dilated loops of small bowel in the partially imaged abdomen    --- End of Report ---          PORTER BROOKS MD; Attending Radiologist  This document has been electronically signed. Nov 4 2021 11:52AM (11-03-21 @ 17:52)

## 2021-11-06 NOTE — DISCHARGE NOTE PROVIDER - HOSPITAL COURSE
PT LEAVING AGAINST MEDICAL ADVISE     39yo man with a history of HTN and alcohol dependence, who presents with nausea and NBNB emesis for 3 days. Pt describes having eaten beef tacos on Friday evening, and the following morning awoke with nausea. Nausea is described as persistent and led to several episodes of vomiting throughout the day on Saturday. Pt describes relief with emesis, but developed muscular soreness and epigastric pain secondary to repeated episodes. The pain remained in this location and did not migrate at any point; pt describes it as feeling similar to food poisoning he has experienced in the past. There is no radiation or alleviating/exacerbating factors. Pt denies fevers, chills. He endorses diarrhea without blood being present. He has cont'd ot pass stool and flatus til the present. He denies recent wt loss, sick contacts, travel. Denies recent abx use. He has never undergone upper or lower endoscopy, and denies a known personal or family history of IBD. He felt very dehydrated but 'could not keep anything down' so came to the ED for evaluation.    In ER, pt found to be afebrile. The abdomen is distended but normal in size per his baseline per pt and girlfriend at bedside. There is moderate tenderness in the epigastrium without rebound. There is no RLQ tenderness. There is a reducible umbilical hernia without overlying skin change. He is non-tremulous, and the skin is not jaundiced.    Labs show a normal WBC ct but with band shift; LEANN with Cr 2.2. Lactate on admission elevated 4.6; down to 1.1 with fluid - appears to be hemoconcentrated. CT was done and showed acute perforated appendicitis with secondary ileus vs SBO. I have spoken with two separate radiologists who agree, that despite the patient's atypical presentation, this denotes a primary appendiceal with secondary ileus picture, rather than a small bowel pathologic process with secondary appendiceal involvement. There is no e/o bowel ischemia or abscess at the moment.    - started on IV abx   - serial abd exams    developed n/v    Xray revealed ileus / sbo   ngt placed for 1 day and then removed upon pt request   kept npo   wbc increase on day 3     repeat ct : revealed increased in size of abscess and rim enchanc.     pt decided that he will leave AMA and get better at home with po abx     seen with Dr. Leon on 11/6/21     pt would like to leave against medical advise , states he " wants to get better at home "   pt understands that his wbc is rising and ct revealed increased size of abscess/ sbo/ ileus .   pt would like to c/w po abx at home and states he will follow up in the office this week on friday 11/12/21 with dr. valles   pt understands that he is has a chance of becoming septic and to return to ER if any  fever / n/v /  or increase pain . pt advised to stay on clears as he states he is having BM/flatus     RN also aware of pt leaving AMA    Dr. Valles was called by Dr. Leon and was informed about above  d/c with po augmentin x 10 days

## 2021-11-06 NOTE — DISCHARGE NOTE PROVIDER - NSDCFUADDINST_GEN_ALL_CORE_FT
if any fever over 100/ vomiting / increased pain , report to nearest ER    follow up in office on 11/12/21

## 2021-11-06 NOTE — DISCHARGE NOTE NURSING/CASE MANAGEMENT/SOCIAL WORK - NSDCPETBCESMAN_GEN_ALL_CORE
GLYCEMIC CONTROL and Diabetes Education:    Please see separate notes on assessment of home diabetes management and education, 11/06/2017. Patient has Medicare health insurance and stated that she was not able to continue taking lantus insulin 33 units daily at bedtime due to cost.   Patient has elevated A1c of 9.0% (11/05/2017). Educated patient about other type of insulin such as 70/30 mix which is available at [facility]. Patient will consider taking this insulin since this is cheap than lantus. Called Dr. Stormy Otero. No order received from MD. He will see patient for POC BG and insulin orders.     Conchis Rees, RN If you are a smoker, it is important for your health to stop smoking. Please be aware that second hand smoke is also harmful.

## 2021-11-06 NOTE — DISCHARGE NOTE PROVIDER - CARE PROVIDER_API CALL
[facility])  Surgery  [address] [place] [address]  Phone: [phone]  Fax: [phone]  Follow Up Time:

## 2021-11-06 NOTE — DISCHARGE NOTE NURSING/CASE MANAGEMENT/SOCIAL WORK - PATIENT PORTAL LINK FT
You can access the FollowMyHealth Patient Portal offered by [facility] by registering at the following website: [email or web address]. By joining [facility]’s FollowMyHealth portal, you will also be able to view your health information using other applications (apps) compatible with our system.

## 2021-11-06 NOTE — DISCHARGE NOTE PROVIDER - NSDCCPCAREPLAN_GEN_ALL_CORE_FT
PRINCIPAL DISCHARGE DIAGNOSIS  Diagnosis: Acute appendicitis  Assessment and Plan of Treatment: pt would like to leave against medical advise , states he " wants to get better at home "   pt understands that his wbc is rising and ct revealed increased size of abscess/ sbo/ ileus .   pt would like to c/w po abx at home and states he will follow up in the office this week on friday 11/12/21 with dr. valles   pt understands that he is has a chance of becoming septic and to return to ER if any  fever / n/v /  or increase pain . pt advised to stay on clears as he states he is having BM/flatus   Dr. Valles was called by Dr. Leon and was informed about above  d/c with po augmentin x 10 days      SECONDARY DISCHARGE DIAGNOSES  Diagnosis: SBO (small bowel obstruction)  Assessment and Plan of Treatment: stay on clears    Diagnosis: Alcohol dependence  Assessment and Plan of Treatment: advise to refrain from etoh   advise to join AA

## 2021-11-08 PROBLEM — I10 ESSENTIAL (PRIMARY) HYPERTENSION: Chronic | Status: ACTIVE | Noted: 2021-11-01

## 2021-11-09 PROBLEM — Z00.00 ENCOUNTER FOR PREVENTIVE HEALTH EXAMINATION: Status: ACTIVE | Noted: 2021-11-09

## 2021-11-11 DIAGNOSIS — K35.32 ACUTE APPENDICITIS WITH PERFORATION, LOCALIZED PERITONITIS, AND GANGRENE, WITHOUT ABSCESS: ICD-10-CM

## 2021-11-11 DIAGNOSIS — F10.20 ALCOHOL DEPENDENCE, UNCOMPLICATED: ICD-10-CM

## 2021-11-11 DIAGNOSIS — F17.210 NICOTINE DEPENDENCE, CIGARETTES, UNCOMPLICATED: ICD-10-CM

## 2021-11-11 DIAGNOSIS — N17.9 ACUTE KIDNEY FAILURE, UNSPECIFIED: ICD-10-CM

## 2021-11-11 DIAGNOSIS — K56.7 ILEUS, UNSPECIFIED: ICD-10-CM

## 2021-11-11 DIAGNOSIS — I10 ESSENTIAL (PRIMARY) HYPERTENSION: ICD-10-CM

## 2021-11-12 ENCOUNTER — APPOINTMENT (OUTPATIENT)
Dept: SURGERY | Facility: CLINIC | Age: 41
End: 2021-11-12
Payer: COMMERCIAL

## 2021-11-12 VITALS
TEMPERATURE: 97.1 F | HEART RATE: 85 BPM | HEIGHT: 74 IN | WEIGHT: 213 LBS | BODY MASS INDEX: 27.34 KG/M2 | OXYGEN SATURATION: 85 %

## 2021-11-12 DIAGNOSIS — Z82.49 FAMILY HISTORY OF ISCHEMIC HEART DISEASE AND OTHER DISEASES OF THE CIRCULATORY SYSTEM: ICD-10-CM

## 2021-11-12 DIAGNOSIS — F17.200 NICOTINE DEPENDENCE, UNSPECIFIED, UNCOMPLICATED: ICD-10-CM

## 2021-11-12 DIAGNOSIS — K35.21 ACUTE APPENDICITIS WITH GENERALIZED PERITONITIS, WITH ABSCESS: ICD-10-CM

## 2021-11-12 DIAGNOSIS — Z72.89 OTHER PROBLEMS RELATED TO LIFESTYLE: ICD-10-CM

## 2021-11-12 DIAGNOSIS — I10 ESSENTIAL (PRIMARY) HYPERTENSION: ICD-10-CM

## 2021-11-12 DIAGNOSIS — Z87.891 PERSONAL HISTORY OF NICOTINE DEPENDENCE: ICD-10-CM

## 2021-11-12 DIAGNOSIS — Z86.79 PERSONAL HISTORY OF OTHER DISEASES OF THE CIRCULATORY SYSTEM: ICD-10-CM

## 2021-11-12 PROCEDURE — 99214 OFFICE O/P EST MOD 30 MIN: CPT | Mod: 57

## 2021-11-12 RX ORDER — AMLODIPINE BESYLATE 5 MG/1
TABLET ORAL
Refills: 0 | Status: ACTIVE | COMMUNITY

## 2021-11-12 RX ORDER — AMOXICILLIN AND CLAVULANATE POTASSIUM 875; 125 MG/1; MG/1
875-125 TABLET, COATED ORAL TWICE DAILY
Qty: 20 | Refills: 0 | Status: COMPLETED | COMMUNITY
Start: 2021-11-12

## 2021-11-12 RX ORDER — LISINOPRIL 30 MG/1
TABLET ORAL
Refills: 0 | Status: ACTIVE | COMMUNITY

## 2021-11-13 VITALS
SYSTOLIC BLOOD PRESSURE: 130 MMHG | RESPIRATION RATE: 14 BRPM | HEART RATE: 85 BPM | TEMPERATURE: 97.1 F | DIASTOLIC BLOOD PRESSURE: 80 MMHG | OXYGEN SATURATION: 97 % | HEIGHT: 74 IN

## 2021-11-13 PROBLEM — Z82.49 FAMILY HISTORY OF ESSENTIAL HYPERTENSION: Status: ACTIVE | Noted: 2021-11-13

## 2021-11-13 PROBLEM — F17.200 CURRENT SOME DAY SMOKER: Status: ACTIVE | Noted: 2021-11-13

## 2021-11-13 PROBLEM — Z72.89 ALCOHOL DRINKING PROBLEM: Status: ACTIVE | Noted: 2021-11-13

## 2021-11-13 PROBLEM — Z86.79 HISTORY OF HYPERTENSION: Status: RESOLVED | Noted: 2021-11-13 | Resolved: 2021-11-13

## 2021-11-13 RX ORDER — DICYCLOMINE HYDROCHLORIDE 10 MG/1
10 CAPSULE ORAL
Qty: 90 | Refills: 0 | Status: DISCONTINUED | COMMUNITY
Start: 2021-10-14

## 2021-11-13 NOTE — ASSESSMENT
[FreeTextEntry1] : This is a 39yo man with acute appendicitis with perforation and secondary ileus. He was recently hospitalized but left the hospital against medical advice after a developing abscess was identified on CT. Thankfully, it appears the pt is recovering and that PO abx are adequately treating the above. He currently appears clinically well.

## 2021-11-13 NOTE — PLAN
[FreeTextEntry1] : - I will extend PO abx therapy for another 10 days; Rx sent to pharmacy\par - Plan for interval lap appendectomy and primary repair of umbilical hernia 12/15/2021. Prior to surgery pt will require PST as well as COVID-19 testing. We reviewed the R/B/A to the above procedure including but not limited to bleeding, injury to nearby structures/bowel, wound healing complications, perioperative cardiac arrhythmia, MI, VTE. Understanding these, the pt wishes to proceed.\par - I have ordered a CT ap for the patient to further evaluate the abdominal abscess which had been developing last week while he was hospitalized. As of now, he continues to improve and will not proceed with study. However, I have instructed him that should pain recur, worsen, or should fevers commence between now and surgery date - he is to telephone my office and proceed with scheduling CT vs head to ER for interval imaging as soon as possible.

## 2021-11-13 NOTE — PHYSICAL EXAM
[Normal Thyroid] : the thyroid was normal [Normal Breath Sounds] : Normal breath sounds [Normal Heart Sounds] : normal heart sounds [Normal Rate and Rhythm] : normal rate and rhythm [No HSM] : no hepatosplenomegaly [No Rash or Lesion] : No rash or lesion [Alert] : alert [Oriented to Person] : oriented to person [Oriented to Place] : oriented to place [Oriented to Time] : oriented to time [Anxious] : anxious [JVD] : no jugular venous distention  [Tender] : was nontender [Purpura] : no purpura  [Petechiae] : no petechiae [Skin Ulcer] : no ulcer [de-identified] : Well appearing man, seated in NAD [de-identified] : Supple; trachea midline [de-identified] : NCAT; sclerae anicteric [de-identified] : CTAB [de-identified] : NA [de-identified] : Soft; minimally distended. Reducible umbilical hernia without skin changes. Minimal ttp in RLQ with no peritoneal signs. [de-identified] : NA [de-identified] : NA [de-identified] : FROM; no joint stiffness or erythema [de-identified] : CN 2-12 intact. Strength in all limbs 5/5. [de-identified] : Appropriate mood and affect.

## 2021-11-13 NOTE — REASON FOR VISIT
[Post Hospitalization] : a post hospitalization visit [FreeTextEntry1] : Pt here to discuss appendicitis

## 2021-11-13 NOTE — HISTORY OF PRESENT ILLNESS
[de-identified] : This is a 39yo man with a history of HTN well-controlled with medication. He was admitted earlier this month with abdominal pain and leukocytosis. Workup in the ED revealed that the pt had perforated acute appendicitis with an associated ileus which required NGT insertion at the time of admission. The pt was admitted at that time and started on broad spectrum antibiotics. His WBC profile improved and he remained afebrile, however, he cont'd to have abdominal pain and distention along with high NGT output. A rpt CT demonstrated a developing 4.9cm abscess in the central/R abdomen. An IR consultation was planned for percutaneous drainage, however the pt insisted upon discharge and left the hospital against medical advice. He was given 10d abx on discharge and presents today (6 days later) for followup. The pt drinks beer every day. [de-identified] : The pt generally feels well. He describes his abdominal distention has gradually improved with each day. He continues to pass stool and flatus, and has not experienced nausea or emesis since discharge. He is trying to adhere to a bland diet. He denies fevers or chills and has been taking the abx as prescribed. He denies worsening abdominal pain, but does still have some baseline pain in the RLQ and as of yest there was some radiating pain to the R back. He denies dysuria or hematuria. He has never undergone colonoscopy.

## 2021-11-13 NOTE — REVIEW OF SYSTEMS
[As Noted in HPI] : as noted in HPI [Negative] : Heme/Lymph [Vomiting] : no vomiting [Constipation] : no constipation [Diarrhea] : no diarrhea [Melena] : no melena

## 2021-11-15 ENCOUNTER — NON-APPOINTMENT (OUTPATIENT)
Age: 41
End: 2021-11-15

## 2021-11-26 ENCOUNTER — TRANSCRIPTION ENCOUNTER (OUTPATIENT)
Age: 41
End: 2021-11-26

## 2021-11-29 ENCOUNTER — OUTPATIENT (OUTPATIENT)
Dept: OUTPATIENT SERVICES | Facility: HOSPITAL | Age: 41
LOS: 1 days | Discharge: HOME | End: 2021-11-29
Payer: COMMERCIAL

## 2021-11-29 VITALS
RESPIRATION RATE: 18 BRPM | SYSTOLIC BLOOD PRESSURE: 117 MMHG | WEIGHT: 201.72 LBS | HEIGHT: 74 IN | TEMPERATURE: 97 F | DIASTOLIC BLOOD PRESSURE: 73 MMHG | HEART RATE: 69 BPM | OXYGEN SATURATION: 97 %

## 2021-11-29 DIAGNOSIS — Z01.818 ENCOUNTER FOR OTHER PREPROCEDURAL EXAMINATION: ICD-10-CM

## 2021-11-29 DIAGNOSIS — Z98.890 OTHER SPECIFIED POSTPROCEDURAL STATES: Chronic | ICD-10-CM

## 2021-11-29 DIAGNOSIS — K37 UNSPECIFIED APPENDICITIS: ICD-10-CM

## 2021-11-29 LAB
ALBUMIN SERPL ELPH-MCNC: 4.4 G/DL — SIGNIFICANT CHANGE UP (ref 3.5–5.2)
ALP SERPL-CCNC: 78 U/L — SIGNIFICANT CHANGE UP (ref 30–115)
ALT FLD-CCNC: 20 U/L — SIGNIFICANT CHANGE UP (ref 0–41)
ANION GAP SERPL CALC-SCNC: 15 MMOL/L — HIGH (ref 7–14)
APPEARANCE UR: CLEAR — SIGNIFICANT CHANGE UP
APTT BLD: 38.3 SEC — SIGNIFICANT CHANGE UP (ref 27–39.2)
AST SERPL-CCNC: 17 U/L — SIGNIFICANT CHANGE UP (ref 0–41)
BASOPHILS # BLD AUTO: 0.02 K/UL — SIGNIFICANT CHANGE UP (ref 0–0.2)
BASOPHILS NFR BLD AUTO: 0.3 % — SIGNIFICANT CHANGE UP (ref 0–1)
BILIRUB SERPL-MCNC: <0.2 MG/DL — SIGNIFICANT CHANGE UP (ref 0.2–1.2)
BILIRUB UR-MCNC: NEGATIVE — SIGNIFICANT CHANGE UP
BUN SERPL-MCNC: 8 MG/DL — LOW (ref 10–20)
CALCIUM SERPL-MCNC: 9.1 MG/DL — SIGNIFICANT CHANGE UP (ref 8.5–10.1)
CHLORIDE SERPL-SCNC: 104 MMOL/L — SIGNIFICANT CHANGE UP (ref 98–110)
CO2 SERPL-SCNC: 23 MMOL/L — SIGNIFICANT CHANGE UP (ref 17–32)
COLOR SPEC: COLORLESS — SIGNIFICANT CHANGE UP
CREAT SERPL-MCNC: 0.9 MG/DL — SIGNIFICANT CHANGE UP (ref 0.7–1.5)
DIFF PNL FLD: NEGATIVE — SIGNIFICANT CHANGE UP
EOSINOPHIL # BLD AUTO: 0.07 K/UL — SIGNIFICANT CHANGE UP (ref 0–0.7)
EOSINOPHIL NFR BLD AUTO: 1.1 % — SIGNIFICANT CHANGE UP (ref 0–8)
GLUCOSE SERPL-MCNC: 66 MG/DL — LOW (ref 70–99)
GLUCOSE UR QL: NEGATIVE — SIGNIFICANT CHANGE UP
HCT VFR BLD CALC: 38.6 % — LOW (ref 42–52)
HGB BLD-MCNC: 12.7 G/DL — LOW (ref 14–18)
IMM GRANULOCYTES NFR BLD AUTO: 0.3 % — SIGNIFICANT CHANGE UP (ref 0.1–0.3)
INR BLD: 1.04 RATIO — SIGNIFICANT CHANGE UP (ref 0.65–1.3)
KETONES UR-MCNC: NEGATIVE — SIGNIFICANT CHANGE UP
LEUKOCYTE ESTERASE UR-ACNC: NEGATIVE — SIGNIFICANT CHANGE UP
LYMPHOCYTES # BLD AUTO: 2.03 K/UL — SIGNIFICANT CHANGE UP (ref 1.2–3.4)
LYMPHOCYTES # BLD AUTO: 32.7 % — SIGNIFICANT CHANGE UP (ref 20.5–51.1)
MCHC RBC-ENTMCNC: 30.9 PG — SIGNIFICANT CHANGE UP (ref 27–31)
MCHC RBC-ENTMCNC: 32.9 G/DL — SIGNIFICANT CHANGE UP (ref 32–37)
MCV RBC AUTO: 93.9 FL — SIGNIFICANT CHANGE UP (ref 80–94)
MONOCYTES # BLD AUTO: 0.57 K/UL — SIGNIFICANT CHANGE UP (ref 0.1–0.6)
MONOCYTES NFR BLD AUTO: 9.2 % — SIGNIFICANT CHANGE UP (ref 1.7–9.3)
NEUTROPHILS # BLD AUTO: 3.49 K/UL — SIGNIFICANT CHANGE UP (ref 1.4–6.5)
NEUTROPHILS NFR BLD AUTO: 56.4 % — SIGNIFICANT CHANGE UP (ref 42.2–75.2)
NITRITE UR-MCNC: NEGATIVE — SIGNIFICANT CHANGE UP
NRBC # BLD: 0 /100 WBCS — SIGNIFICANT CHANGE UP (ref 0–0)
PH UR: 6.5 — SIGNIFICANT CHANGE UP (ref 5–8)
PLATELET # BLD AUTO: 307 K/UL — SIGNIFICANT CHANGE UP (ref 130–400)
POTASSIUM SERPL-MCNC: 4.5 MMOL/L — SIGNIFICANT CHANGE UP (ref 3.5–5)
POTASSIUM SERPL-SCNC: 4.5 MMOL/L — SIGNIFICANT CHANGE UP (ref 3.5–5)
PROT SERPL-MCNC: 7.7 G/DL — SIGNIFICANT CHANGE UP (ref 6–8)
PROT UR-MCNC: NEGATIVE — SIGNIFICANT CHANGE UP
PROTHROM AB SERPL-ACNC: 11.9 SEC — SIGNIFICANT CHANGE UP (ref 9.95–12.87)
RBC # BLD: 4.11 M/UL — LOW (ref 4.7–6.1)
RBC # FLD: 12.9 % — SIGNIFICANT CHANGE UP (ref 11.5–14.5)
SODIUM SERPL-SCNC: 142 MMOL/L — SIGNIFICANT CHANGE UP (ref 135–146)
SP GR SPEC: 1 — LOW (ref 1.01–1.03)
UROBILINOGEN FLD QL: SIGNIFICANT CHANGE UP
WBC # BLD: 6.2 K/UL — SIGNIFICANT CHANGE UP (ref 4.8–10.8)
WBC # FLD AUTO: 6.2 K/UL — SIGNIFICANT CHANGE UP (ref 4.8–10.8)

## 2021-11-29 PROCEDURE — 93010 ELECTROCARDIOGRAM REPORT: CPT

## 2021-11-29 RX ORDER — AMLODIPINE BESYLATE 2.5 MG/1
0 TABLET ORAL
Qty: 0 | Refills: 0 | DISCHARGE

## 2021-11-29 NOTE — H&P PST ADULT - TOBACCO USE
Gen - WDWN, NAD, comfortable and non-toxic appearing  Skin - warm, dry, intact   Resp - Breathing comfortably on RA.  Neuro - AxOx3, clear speech, grossly unremarkable.  Psych - Calm and cooperative. Normal mood and affect.
Former smoker

## 2021-11-29 NOTE — H&P PST ADULT - REASON FOR ADMISSION
Case Type: OP Non-block Time  Suite: [facility]  Proceduralist: Joe Vitale Surgery Date: 12-   Procedure: LAPAROSCOPIC APPENDECTOMY AND UMBILICAL HERNIA REPAIR  Laterality: N/A  Length of Procedure: 90 Minutes  Anesthesia Type: General  Covid Testing 12/12/2021 0950

## 2021-11-29 NOTE — H&P PST ADULT - HISTORY OF PRESENT ILLNESS
Uziel Swenson is a 40 yp male with PMH of HTN, ?? Alcohol dependency who presents to PAST for the above procedure due to the diagnosis of abdominal pain with perforated Acute appendicitis with an associated ileus and leucocytosis on 11/1/2021. Patient was Admitted to [facility] on 11/1/2021 with severe abdominal pain and diagnosed as above. Patient was treated with NGT for decompression and IV antibiotics. CT of abdomen raveled 4.9 cm abscess in the center of the right side of the abdomen. Patient suppose to go for draining at Interventional radiology. Patient refused and went home against of medical advise on 11/6/2021 with Augmentin x 10 days.  Patient completed the dose and asymptomatic at this time. Patient also has a small umbilical hernia.   Patient denies any cp, sob, palpitations, fever, cough, URI, abdominal pains, N/V, UTI, Rashes or open wounds. As per patient exercise tolerance of 2 fos walks with out sob.   Patient denies any s/s covid 19 and reports no contact with known positive people. Patient has appointment for repeat covid testing pre op and instructed to continue to self monitor and report any concerns to MD. Pt will continue to practice self isolation and  exposure control measures pre op   Anesthesia Alert  YES Class IV--Difficult Airway  NO--History of neck surgery or radiation  NO--Limited ROM of neck  NO--History of Malignant hyperthermia  NO--Personal or family history of Pseudocholinesterase deficiency  NO--Prior Anesthesia Complication  NO--Latex Allergy  NO--Loose teeth  NO--History of Rheumatoid Arthritis  NO--VERNON  NO--Risk of bleeding

## 2021-12-12 ENCOUNTER — LABORATORY RESULT (OUTPATIENT)
Age: 41
End: 2021-12-12

## 2021-12-15 ENCOUNTER — APPOINTMENT (OUTPATIENT)
Dept: SURGERY | Facility: HOSPITAL | Age: 41
End: 2021-12-15

## 2021-12-15 ENCOUNTER — OUTPATIENT (OUTPATIENT)
Dept: OUTPATIENT SERVICES | Facility: HOSPITAL | Age: 41
LOS: 1 days | Discharge: HOME | End: 2021-12-15
Payer: COMMERCIAL

## 2021-12-15 ENCOUNTER — RESULT REVIEW (OUTPATIENT)
Age: 41
End: 2021-12-15

## 2021-12-15 VITALS — HEART RATE: 81 BPM | SYSTOLIC BLOOD PRESSURE: 126 MMHG | DIASTOLIC BLOOD PRESSURE: 79 MMHG | RESPIRATION RATE: 17 BRPM

## 2021-12-15 VITALS
WEIGHT: 201.94 LBS | TEMPERATURE: 98 F | SYSTOLIC BLOOD PRESSURE: 124 MMHG | OXYGEN SATURATION: 98 % | RESPIRATION RATE: 17 BRPM | HEART RATE: 75 BPM | HEIGHT: 74 IN | DIASTOLIC BLOOD PRESSURE: 81 MMHG

## 2021-12-15 DIAGNOSIS — Z98.890 OTHER SPECIFIED POSTPROCEDURAL STATES: Chronic | ICD-10-CM

## 2021-12-15 PROCEDURE — 44970 LAPAROSCOPY APPENDECTOMY: CPT

## 2021-12-15 PROCEDURE — 49585: CPT | Mod: 59

## 2021-12-15 PROCEDURE — 88304 TISSUE EXAM BY PATHOLOGIST: CPT | Mod: 26

## 2021-12-15 RX ORDER — HYDROMORPHONE HYDROCHLORIDE 2 MG/ML
0.5 INJECTION INTRAMUSCULAR; INTRAVENOUS; SUBCUTANEOUS
Refills: 0 | Status: DISCONTINUED | OUTPATIENT
Start: 2021-12-15 | End: 2021-12-15

## 2021-12-15 RX ORDER — LISINOPRIL 2.5 MG/1
0 TABLET ORAL
Qty: 0 | Refills: 0 | DISCHARGE

## 2021-12-15 RX ORDER — SODIUM CHLORIDE 9 MG/ML
1000 INJECTION, SOLUTION INTRAVENOUS
Refills: 0 | Status: DISCONTINUED | OUTPATIENT
Start: 2021-12-15 | End: 2021-12-15

## 2021-12-15 RX ORDER — AMLODIPINE BESYLATE 2.5 MG/1
10 TABLET ORAL
Qty: 0 | Refills: 0 | DISCHARGE

## 2021-12-15 RX ORDER — OXYCODONE HYDROCHLORIDE 5 MG/1
1 TABLET ORAL
Qty: 12 | Refills: 0
Start: 2021-12-15

## 2021-12-15 RX ORDER — AMLODIPINE BESYLATE 2.5 MG/1
5 TABLET ORAL
Qty: 0 | Refills: 0 | DISCHARGE

## 2021-12-15 RX ORDER — LISINOPRIL 2.5 MG/1
40 TABLET ORAL
Qty: 0 | Refills: 0 | DISCHARGE

## 2021-12-15 RX ADMIN — HYDROMORPHONE HYDROCHLORIDE 0.5 MILLIGRAM(S): 2 INJECTION INTRAMUSCULAR; INTRAVENOUS; SUBCUTANEOUS at 09:28

## 2021-12-15 RX ADMIN — HYDROMORPHONE HYDROCHLORIDE 0.5 MILLIGRAM(S): 2 INJECTION INTRAMUSCULAR; INTRAVENOUS; SUBCUTANEOUS at 09:48

## 2021-12-15 RX ADMIN — HYDROMORPHONE HYDROCHLORIDE 0.5 MILLIGRAM(S): 2 INJECTION INTRAMUSCULAR; INTRAVENOUS; SUBCUTANEOUS at 09:38

## 2021-12-15 NOTE — ASU DISCHARGE PLAN (ADULT/PEDIATRIC) - ASU DC SPECIAL INSTRUCTIONSFT
No heavy lifting over 15 pounds for 6 weeks  Okay to shower after 24 hours, let water run down, do not scrub at incision area  Follow-up in clinic  Call the office if you have any questions

## 2021-12-15 NOTE — ASU PATIENT PROFILE, ADULT - FALL HARM RISK - HARM RISK INTERVENTIONS

## 2021-12-15 NOTE — ASU DISCHARGE PLAN (ADULT/PEDIATRIC) - NS MD DC FALL RISK RISK
For information on Fall & Injury Prevention, visit: [email or web address] OR  [email or web address] OR  https://www.cdc.gov/steadi/patient.html

## 2021-12-15 NOTE — BRIEF OPERATIVE NOTE - NSICDXBRIEFPROCEDURE_GEN_ALL_CORE_FT
PROCEDURES:  Laparoscopic appendectomy 15-Dec-2021 09:35:00 w/ umbilibcal hernia repair Laurel Patricia

## 2021-12-15 NOTE — BRIEF OPERATIVE NOTE - OPERATION/FINDINGS
Appendix identified  Mesentry and artery taken with ligature  Appendix taken at base w/ ENDO APPLE  and Umbilical hernia repair w/ prolene, no mesh

## 2021-12-17 LAB — SURGICAL PATHOLOGY STUDY: SIGNIFICANT CHANGE UP

## 2021-12-20 DIAGNOSIS — I10 ESSENTIAL (PRIMARY) HYPERTENSION: ICD-10-CM

## 2021-12-20 DIAGNOSIS — K36 OTHER APPENDICITIS: ICD-10-CM

## 2021-12-20 DIAGNOSIS — Z87.891 PERSONAL HISTORY OF NICOTINE DEPENDENCE: ICD-10-CM

## 2021-12-20 DIAGNOSIS — K42.9 UMBILICAL HERNIA WITHOUT OBSTRUCTION OR GANGRENE: ICD-10-CM

## 2021-12-20 DIAGNOSIS — F10.20 ALCOHOL DEPENDENCE, UNCOMPLICATED: ICD-10-CM

## 2021-12-29 ENCOUNTER — APPOINTMENT (OUTPATIENT)
Dept: SURGERY | Facility: CLINIC | Age: 41
End: 2021-12-29
Payer: COMMERCIAL

## 2021-12-29 VITALS
RESPIRATION RATE: 16 BRPM | TEMPERATURE: 98 F | OXYGEN SATURATION: 95 % | HEART RATE: 92 BPM | SYSTOLIC BLOOD PRESSURE: 130 MMHG | DIASTOLIC BLOOD PRESSURE: 65 MMHG

## 2021-12-29 DIAGNOSIS — K35.80 UNSPECIFIED ACUTE APPENDICITIS: ICD-10-CM

## 2021-12-29 PROCEDURE — 99024 POSTOP FOLLOW-UP VISIT: CPT

## 2021-12-29 NOTE — HISTORY OF PRESENT ILLNESS
[de-identified] : This is a 39yo man with a history of HTN well-controlled with medication. He was admitted earlier this month with abdominal pain and leukocytosis. Workup in the ED revealed that the pt had perforated acute appendicitis with an associated ileus which required NGT insertion at the time of admission. The pt was admitted at that time and started on broad spectrum antibiotics. His WBC profile improved and he remained afebrile, however, he cont'd to have abdominal pain and distention along with high NGT output. A rpt CT demonstrated a developing 4.9cm abscess in the central/R abdomen. An IR consultation was planned for percutaneous drainage, however the pt insisted upon discharge and left the hospital against medical advice. He was given 10d abx on discharge; he ultimately underwent successful laparoscopic appendectomy and umbilical hernia repair (primary) on 12/15/21. He presents today for followup. [de-identified] : Patient has been doing well at home. He denies ongoing abdominal pain. Denies nausea, emesis with diet. Has been having normal BM without diarrhea or constipation. There have been no fevers or chills. He also denies any new jaundice as well as problems with the wounds. Patient has remained active at home and has not yet returned to work.

## 2021-12-29 NOTE — PLAN
[FreeTextEntry1] : - Cont diet as tolerated\par - Pt was advised to not engage in heavy lifting or strenuous activity for 6 weeks from surgery date\par - Pathology reviewed with patient today\par - Return to office as needed

## 2021-12-29 NOTE — ASSESSMENT
[FreeTextEntry1] : 42yo man with history of acute perforated appendicitis, now s/p lap appendectomy and umbilical hernia repair. Doing well.

## 2022-02-21 NOTE — PHYSICAL EXAM
[Normal Thyroid] : the thyroid was normal [Normal Breath Sounds] : Normal breath sounds [Normal Heart Sounds] : normal heart sounds [Normal Rate and Rhythm] : normal rate and rhythm [No HSM] : no hepatosplenomegaly [No Rash or Lesion] : No rash or lesion [Alert] : alert [Oriented to Person] : oriented to person [Oriented to Place] : oriented to place [Oriented to Time] : oriented to time [Anxious] : anxious [JVD] : no jugular venous distention  [Tender] : was nontender [Purpura] : no purpura  [Petechiae] : no petechiae [Skin Ulcer] : no ulcer [de-identified] : Well appearing man, seated in NAD [de-identified] : Supple; trachea midline [de-identified] : NCAT; sclerae anicteric [de-identified] : CTAB [de-identified] : NA [de-identified] : Soft; minimally distended. Wounds healing with no e/o infection or incisional hernia. Nontender. [de-identified] : NA [de-identified] : NA [de-identified] : FROM; no joint stiffness or erythema [de-identified] : CN 2-12 intact. Strength in all limbs 5/5. [de-identified] : Appropriate mood and affect. 1

## 2022-05-05 NOTE — PROGRESS NOTE ADULT - ASSESSMENT
39yo man with a history of HTN now w/ n/v for 3d, found to have acute perforated appendicitis on CT.      New 2.8 x 2 cm periappendiceal abscess   right central mesenteric loculated fluid-gas collection now more organized, with partial peripheral rim enhancement, measuring 4.9 x 2.8 cm, compatible with developing abscess      Perforated appendicitis w/ ileus   - continue w with IV Zosyn, Iv fluids   - encouraged OOB/ambulating  - no opiates   - continue to monitor bowel function   - trend labs      will d/w Attending   
  41yo man with a history of HTN now w/ n/v for 3d, found to have acute perforated appendicitis on CT.      Perforated appendicitis w/ ileus   - continue w with IV Zosyn, Iv fluids   - encouraged OOB/ambulating  - no opiates   - continue to monitor bowel function   - trend labs   - Obstructive series reviewed     WITH INCREASED WBC AND OBS with SBO   will obtain CT a/p IV after d/w Attending   
41yo man with a history of HTN now w/ n/v for 3d, found to have acute perforated appendicits on CT. HD stable.    1. Perforated appendicitis w/likely ileus   - continue conservative management for now with IV Zosyn, Iv fluids   - may have ice chips & sips of water today   - encouraged OOB/ambulating   - continue to monitor bowel function   - trend labs   - replete potassium today    2. ETOH use   - on librium PRN   - continue folic acid, thiamine    3. GI/DVT prophylaxis   - protonix/SQ heparin    Pt seen & examined with Dr. Hill    
Assessment:  41yo man with a history of HTN now w/ n/v for 3d, found to have acute perforated appendicitis on CT.  Pt had episode of increased abdominal distentions and vomiting yesterday. Placed NGT. NGT removed today. Will obtain obstructive series.     Perforated appendicitis w/ ileus   - continue conservative management for now with IV Zosyn, Iv fluids   - encouraged OOB/ambulating  - no opiates   - continue to monitor bowel function   - trend labs   - Obstructive series.      Date/Time: 11-04-21 @ 13:43  
39yo man with a history of HTN now w/ n/v for 3d, found to have acute perforated appendicits on CT.  Pt had episode of increased abdominal distentions and vomiting x1 today(green bilious)    1. Perforated appendicitis w/ ileus  - episode of bilious vomiting x 1 today, now feeling less distended. Will re-evaluate later today for possible NGT   - continue conservative management for now with IV Zosyn, Iv fluids   - encouraged OOB/ambulating   - continue to monitor bowel function   - trend labs   - replete potassium today    2. ETOH use   - on librium PRN   - continue folic acid, thiamine    3. GI/DVT prophylaxis   - protonix/SQ heparin    Pt seen with Dr. Hill
Her/She

## 2022-05-23 NOTE — SBIRT NOTE ADULT - NSSBIRTCONCERNEDDRINK_GEN_A_CORE
Obstructive sleep apnea (MARIBELL) 
No

## 2023-10-24 ENCOUNTER — EMERGENCY (EMERGENCY)
Facility: HOSPITAL | Age: 43
LOS: 0 days | Discharge: HOME | End: 2023-10-24
Attending: EMERGENCY MEDICINE
Payer: OTHER MISCELLANEOUS

## 2023-10-24 VITALS
HEART RATE: 91 BPM | HEIGHT: 74 IN | OXYGEN SATURATION: 99 % | WEIGHT: 220.02 LBS | SYSTOLIC BLOOD PRESSURE: 142 MMHG | DIASTOLIC BLOOD PRESSURE: 92 MMHG | TEMPERATURE: 98 F | RESPIRATION RATE: 18 BRPM

## 2023-10-24 DIAGNOSIS — M79.601 PAIN IN RIGHT ARM: ICD-10-CM
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
## 2023-10-24 DIAGNOSIS — M25.511 PAIN IN RIGHT SHOULDER: ICD-10-CM
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
## 2023-10-24 DIAGNOSIS — Z98.890 OTHER SPECIFIED POSTPROCEDURAL STATES: Chronic | ICD-10-CM

## 2023-10-24 PROCEDURE — 73060 X-RAY EXAM OF HUMERUS: CPT | Mod: RT

## 2023-10-24 PROCEDURE — 99284 EMERGENCY DEPT VISIT MOD MDM: CPT | Mod: 25

## 2023-10-24 PROCEDURE — 73030 X-RAY EXAM OF SHOULDER: CPT | Mod: RT

## 2023-10-24 PROCEDURE — 73060 X-RAY EXAM OF HUMERUS: CPT | Mod: 26,RT

## 2023-10-24 PROCEDURE — 73030 X-RAY EXAM OF SHOULDER: CPT | Mod: 26,RT

## 2023-10-24 PROCEDURE — 99284 EMERGENCY DEPT VISIT MOD MDM: CPT

## 2023-10-24 RX ORDER — ACETAMINOPHEN 500 MG
650 TABLET ORAL ONCE
Refills: 0 | Status: COMPLETED | OUTPATIENT
Start: 2023-10-24 | End: 2023-10-24

## 2023-10-24 RX ADMIN — Medication 650 MILLIGRAM(S): at 12:54

## 2023-10-24 NOTE — ED PROVIDER NOTE - CLINICAL SUMMARY MEDICAL DECISION MAKING FREE TEXT BOX
42-year-old male presenting for evaluation of right shoulder pain status post attempted trying and pull jackhammer today.  No numbness/focal weakness.  No other injuries or acute complaints.  Uncomfortable appearing, non toxic. NCAT PERRLA EOMI neck supple non tender normal wob WWPx4 neuro non focal.  2+ equal pulses, less than 2-second capillary refill.  Tenderness palpation right shoulder, mildly limited active range of motion, full passive range of motion.  Tenderness palpation right AC joint.  Imaging reviewed.  Sling applied.  Comfortable with discharge and follow-up outpatient, strict return precautions given. Endorses understanding of all of this and aware that they can return at any time for new or concerning symptoms. No further questions or concerns at this time

## 2023-10-24 NOTE — ED PROVIDER NOTE - PATIENT PORTAL LINK FT
You can access the FollowMyHealth Patient Portal offered by [facility] by registering at the following website: [email or web address]. By joining [facility]’s FollowMyHealth portal, you will also be able to view your health information using other applications (apps) compatible with our system.

## 2023-10-24 NOTE — ED PROVIDER NOTE - OBJECTIVE STATEMENT
42-year-old male with no significant past medical history who presented to ED with right shoulder and right upper arm pain.  Reports that symptoms started earlier today when he was trying to pull drill out of the ground while he was working.  Pain is constant and worse with movement.  Denies pain or discomfort or injury elsewhere.

## 2023-10-24 NOTE — ED PROVIDER NOTE - PROVIDER TOKENS
PROVIDER:[TOKEN:[[id number]:[id number]],FOLLOWUP:[1-3 Days]] PROVIDER:[TOKEN:[[id number]],FOLLOWUP:[1-3 Days]]

## 2023-10-24 NOTE — ED PROCEDURE NOTE - NS ED PERI NEURO NEG
Quality 130: Documentation Of Current Medications In The Medical Record: Current Medications Documented Detail Level: Detailed Pre-application: Motor, sensory, and vascular responses intact in the injured extremity./Post-application: Motor, sensory, and vascular responses intact in the injured extremity./The patient/caregiver verbalized understanding of how to care for the injured extremity with splint Quality 226: Preventive Care And Screening: Tobacco Use: Screening And Cessation Intervention: Patient screened for tobacco use and is an ex/non-smoker

## 2023-10-24 NOTE — ED ADULT TRIAGE NOTE - CHIEF COMPLAINT QUOTE
Pt was at work using a marco hammer that got stuck, when he pulled it out c/o right shoulder and arm pain

## 2023-10-24 NOTE — ED PROVIDER NOTE - NS ED ATTENDING STATEMENT MOD
This was a shared visit with the GERONIMO. I reviewed and verified the documentation and independently performed the documented:

## 2023-10-24 NOTE — ED PROVIDER NOTE - NSFOLLOWUPINSTRUCTIONS_ED_ALL_ED_FT
Please make sure to follow up with your primary care doctor in 3 days.    Arm Pain    WHAT YOU NEED TO KNOW:    Your arm pain may be caused by a number of conditions. Examples include arthritis, nerve problems, or an awkward position while you sleep. X-rays did not show a broken bone in your arm or wrist. Arm pain may be a sign of a serious condition that needs immediate care, such as a heart attack.    DISCHARGE INSTRUCTIONS:    Call 911 for any of the following: You have any of the following signs of a heart attack:     Squeezing, pressure, or pain in your chest that lasts longer than 5 minutes or returns    Discomfort or pain in your back, neck, jaw, stomach, or arm     Trouble breathing or a fast, fluttery heartbeat    Nausea or vomiting    Lightheadedness or a sudden cold sweat, especially with chest pain or trouble breathing    Return to the emergency department if:     You have severe pain, or pain that spreads from your arm to other areas.    You have swelling, tingling, or numbness in your hand or fingers, or the skin turns blue.    You cannot move your arm.    Contact your healthcare provider if:     You have questions or concerns about your condition or care.    Medicines: You may need any of the following:     Prescription pain medicine may be given. Ask how to take this medicine safely.    NSAIDs, such as ibuprofen, help decrease swelling, pain, and fever. This medicine is available with or without a doctor's order. NSAIDs can cause stomach bleeding or kidney problems in certain people. If you take blood thinner medicine, always ask your healthcare provider if NSAIDs are safe for you. Always read the medicine label and follow directions.    Take your medicine as directed. Contact your healthcare provider if you think your medicine is not helping or if you have side effects. Tell him or her if you are allergic to any medicine. Keep a list of the medicines, vitamins, and herbs you take. Include the amounts, and when and why you take them. Bring the list or the pill bottles to follow-up visits. Carry your medicine list with you in case of an emergency.    Self-care:     Rest your arm as directed. A sling may be used to keep your arm from moving while it heals.    Apply ice as directed. Ice helps decrease pain and swelling. Ice may also help prevent tissue damage. Use an ice pack, or put crushed ice in a plastic bag. Cover it with a towel. Apply it to your arm for 20 minutes every few hours, or as directed. Ask how many times to apply ice each day, and for how many days.    Elevate your arm above the level of your heart as often as you can. This will help decrease swelling and pain. Prop your arm on pillows or blankets to keep the area elevated comfortably.    Adjust your position if you work in front of a computer. You may need arm or wrist supports or change the height of your chair.     Keep a pain record. Write down when your pain happens and how severe it is. Include any other symptoms you have with your pain. A record will help you keep track of pain cycles. Bring the record with you to your follow-up visits. It may also help your healthcare provider find out what is causing your pain.    Follow up with your healthcare provider as directed: You may need physical therapy. You may need to see an orthopedic specialist. Write down your questions so you remember to ask them during your visits.    © Copyright [employer] 2019 All illustrations and images included in CareNotes are the copyrighted property of [employer]., Inc. or [employer].

## 2023-10-24 NOTE — ED PROVIDER NOTE - PHYSICAL EXAMINATION
CONSTITUTIONAL: Well-appearing; in no apparent distress.   ENT: Hearing is intact with good acuity to spoken voice.  Patient is speaking clearly, not muffled and airway is intact.   RESPIRATORY: No signs of respiratory distress. Lung sounds are clear in all lobes bilaterally without rales, rhonchi, or wheezes.  CARDIOVASCULAR: Regular rate and rhythm.   MS: RUE with no obvious deformity or swelling; nontender to palpation; decreased ROM in the R shoulder area; sensory function intact; distal pulse present. Rest of the upper and lower extremities unremarkable. Steady gait noted. NEURO: A & O x 3. Normal speech. No focal deficit.  PSYCHOLOGICAL: Appropriate mood and affect. Good judgement and insight.

## 2023-10-24 NOTE — ED PROVIDER NOTE - CARE PROVIDER_API CALL
Troy Concepcion  Obstetrics and Gynecology  [address] [place] [id number]  Phone: [phone]  Fax: ()-  Follow Up Time: 1-3 Days   Deni Concepcion  Orthopaedic Surgery  [address] [place] [address]  Phone: [phone]  Fax: [phone]  Follow Up Time: 1-3 Days

## 2023-10-24 NOTE — ED PROVIDER NOTE - PROGRESS NOTE DETAILS
X-ray unremarkable. Meds given in ED.  Sling applied.  Patient is stable for discharge.  Will have patient follow-up with Ortho outpatient.

## 2025-05-13 NOTE — ASU DISCHARGE PLAN (ADULT/PEDIATRIC) - CARE PROVIDER_API CALL
Joe Hill)  Surgery  [address] [place] [address]  Phone: [phone]  Fax: [phone]  Follow Up Time:    0